# Patient Record
Sex: MALE | Race: WHITE
[De-identification: names, ages, dates, MRNs, and addresses within clinical notes are randomized per-mention and may not be internally consistent; named-entity substitution may affect disease eponyms.]

---

## 2023-01-26 PROBLEM — Z00.00 ENCOUNTER FOR PREVENTIVE HEALTH EXAMINATION: Status: ACTIVE | Noted: 2023-01-26

## 2023-01-27 ENCOUNTER — APPOINTMENT (OUTPATIENT)
Dept: ORTHOPEDIC SURGERY | Facility: CLINIC | Age: 23
End: 2023-01-27
Payer: COMMERCIAL

## 2023-01-27 VITALS
WEIGHT: 170 LBS | BODY MASS INDEX: 25.18 KG/M2 | SYSTOLIC BLOOD PRESSURE: 116 MMHG | HEIGHT: 69 IN | TEMPERATURE: 97.5 F | HEART RATE: 59 BPM | DIASTOLIC BLOOD PRESSURE: 71 MMHG | OXYGEN SATURATION: 99 %

## 2023-01-27 DIAGNOSIS — M23.321 OTHER MENISCUS DERANGEMENTS, POSTERIOR HORN OF MEDIAL MENISCUS, RIGHT KNEE: ICD-10-CM

## 2023-01-27 DIAGNOSIS — F17.200 NICOTINE DEPENDENCE, UNSPECIFIED, UNCOMPLICATED: ICD-10-CM

## 2023-01-27 DIAGNOSIS — Z72.89 OTHER PROBLEMS RELATED TO LIFESTYLE: ICD-10-CM

## 2023-01-27 DIAGNOSIS — Z86.39 PERSONAL HISTORY OF OTHER ENDOCRINE, NUTRITIONAL AND METABOLIC DISEASE: ICD-10-CM

## 2023-01-27 DIAGNOSIS — Z83.438 FAMILY HISTORY OF OTHER DISORDER OF LIPOPROTEIN METABOLISM AND OTHER LIPIDEMIA: ICD-10-CM

## 2023-01-27 DIAGNOSIS — Z80.42 FAMILY HISTORY OF MALIGNANT NEOPLASM OF PROSTATE: ICD-10-CM

## 2023-01-27 DIAGNOSIS — Z83.3 FAMILY HISTORY OF DIABETES MELLITUS: ICD-10-CM

## 2023-01-27 PROCEDURE — 99203 OFFICE O/P NEW LOW 30 MIN: CPT

## 2023-01-27 PROCEDURE — 73564 X-RAY EXAM KNEE 4 OR MORE: CPT | Mod: RT

## 2023-01-27 NOTE — PHYSICAL EXAM
[LE] : Sensory: Intact in bilateral lower extremities [Knee] : patellar 2+ and symmetric bilaterally [PT] : posterior tibial 2+ and symmetric bilaterally [de-identified] : Pt is a pleasant 22 year old male in NAD and AAOx3. 25 BMI. The pt has a mildly antalgic gait. Physical examination of both knees reveals normal contours, skin intact with no signs of infection, no erythema, no swelling, no effusion, no distal lymphedema or phlebitis, no patholaxity. ROM of the right knee reveals 0°-125° Strength is 5/5 within this arc of motion. There is no pain on palpation to the medial/lateral joint line. Patient noted to have pain with full extension and with duck walk (keanu test). There are no neurological deficits.\par  [de-identified] : X-rays were ordered, obtained, and interpreted by me today: 4 views of the right knee demonstrate no acute bony pathology , with no acute fracture or dislocation.\par \par Mri right knee from [facility] 1/4/23 reviewed: demonstrating complex tear of the posterior horn and root of the medial meniscus as well as low grade MCL and PLC injury.\par

## 2023-01-27 NOTE — HISTORY OF PRESENT ILLNESS
[de-identified] : 21 y/o male who works at the [place] [employer] presents with right knee pain due to an injury while horse playing with his friend on 12/23/2023. He states that he he was pushed back with his knee bent underneath him. He was seen in Dr. Jens Mendes  and had an MRI done. He was treated with PT which did help. He is here for a second opinion. He states that his symptoms had improved but he has not return to all activities. He does have some mild symptoms with certain motions. He denies any numbness or tingling. \par \par Hx: HLD

## 2023-01-27 NOTE — DISCUSSION/SUMMARY
[de-identified] : Pt is a pleasant 22 year old male with right knee pain secondary to medial meniscus root tear.  A lengthy discussion was held regarding the patients condition and treatment options including all risks, benefits, prognosis and outcomes of each were discussed in detail. Surgical v nonoperative management was discussed and operative management with arthroscopic meniscal root repair was advised since he is a young active pt that has physically demanding work. Post op care discussed which includes non weightbearing on crutches for 6 weeks and up to 2 months off of work. The patient is going to consider surgical timing since he just received a recent job offer. Information for surgical coordinator provided. He will contact surgical coordinator if he decides on pursuing surgery in the near future. The patient will contact me if there are any concerns.  Follow up will be prn. The patient expressed understanding and all questions were answered.\par

## 2023-04-06 ENCOUNTER — APPOINTMENT (OUTPATIENT)
Dept: ORTHOPEDIC SURGERY | Facility: HOSPITAL | Age: 23
End: 2023-04-06

## 2023-12-10 ENCOUNTER — EMERGENCY (EMERGENCY)
Facility: HOSPITAL | Age: 23
LOS: 1 days | Discharge: HOME | End: 2023-12-10
Attending: EMERGENCY MEDICINE | Admitting: EMERGENCY MEDICINE
Payer: COMMERCIAL

## 2023-12-10 VITALS
DIASTOLIC BLOOD PRESSURE: 89 MMHG | WEIGHT: 179.9 LBS | OXYGEN SATURATION: 99 % | HEIGHT: 70 IN | RESPIRATION RATE: 19 BRPM | HEART RATE: 95 BPM | TEMPERATURE: 99 F | SYSTOLIC BLOOD PRESSURE: 135 MMHG

## 2023-12-10 PROCEDURE — 99284 EMERGENCY DEPT VISIT MOD MDM: CPT

## 2023-12-11 VITALS
DIASTOLIC BLOOD PRESSURE: 64 MMHG | OXYGEN SATURATION: 97 % | HEART RATE: 84 BPM | RESPIRATION RATE: 17 BRPM | TEMPERATURE: 98 F | SYSTOLIC BLOOD PRESSURE: 101 MMHG

## 2023-12-11 LAB
ALBUMIN SERPL ELPH-MCNC: 4 G/DL — SIGNIFICANT CHANGE UP (ref 3.3–5)
ALBUMIN SERPL ELPH-MCNC: 4 G/DL — SIGNIFICANT CHANGE UP (ref 3.3–5)
ALP SERPL-CCNC: 113 U/L — SIGNIFICANT CHANGE UP (ref 40–120)
ALP SERPL-CCNC: 113 U/L — SIGNIFICANT CHANGE UP (ref 40–120)
ALT FLD-CCNC: 45 U/L — SIGNIFICANT CHANGE UP (ref 12–78)
ALT FLD-CCNC: 45 U/L — SIGNIFICANT CHANGE UP (ref 12–78)
ANION GAP SERPL CALC-SCNC: 9 MMOL/L — SIGNIFICANT CHANGE UP (ref 5–17)
ANION GAP SERPL CALC-SCNC: 9 MMOL/L — SIGNIFICANT CHANGE UP (ref 5–17)
AST SERPL-CCNC: 23 U/L — SIGNIFICANT CHANGE UP (ref 15–37)
AST SERPL-CCNC: 23 U/L — SIGNIFICANT CHANGE UP (ref 15–37)
BASOPHILS # BLD AUTO: 0.03 K/UL — SIGNIFICANT CHANGE UP (ref 0–0.2)
BASOPHILS # BLD AUTO: 0.03 K/UL — SIGNIFICANT CHANGE UP (ref 0–0.2)
BASOPHILS NFR BLD AUTO: 0.3 % — SIGNIFICANT CHANGE UP (ref 0–2)
BASOPHILS NFR BLD AUTO: 0.3 % — SIGNIFICANT CHANGE UP (ref 0–2)
BILIRUB SERPL-MCNC: 0.8 MG/DL — SIGNIFICANT CHANGE UP (ref 0.2–1.2)
BILIRUB SERPL-MCNC: 0.8 MG/DL — SIGNIFICANT CHANGE UP (ref 0.2–1.2)
BUN SERPL-MCNC: 16 MG/DL — SIGNIFICANT CHANGE UP (ref 7–23)
BUN SERPL-MCNC: 16 MG/DL — SIGNIFICANT CHANGE UP (ref 7–23)
CALCIUM SERPL-MCNC: 9.2 MG/DL — SIGNIFICANT CHANGE UP (ref 8.5–10.1)
CALCIUM SERPL-MCNC: 9.2 MG/DL — SIGNIFICANT CHANGE UP (ref 8.5–10.1)
CHLORIDE SERPL-SCNC: 105 MMOL/L — SIGNIFICANT CHANGE UP (ref 96–108)
CHLORIDE SERPL-SCNC: 105 MMOL/L — SIGNIFICANT CHANGE UP (ref 96–108)
CO2 SERPL-SCNC: 26 MMOL/L — SIGNIFICANT CHANGE UP (ref 22–31)
CO2 SERPL-SCNC: 26 MMOL/L — SIGNIFICANT CHANGE UP (ref 22–31)
CREAT SERPL-MCNC: 1.2 MG/DL — SIGNIFICANT CHANGE UP (ref 0.5–1.3)
CREAT SERPL-MCNC: 1.2 MG/DL — SIGNIFICANT CHANGE UP (ref 0.5–1.3)
EGFR: 88 ML/MIN/1.73M2 — SIGNIFICANT CHANGE UP
EGFR: 88 ML/MIN/1.73M2 — SIGNIFICANT CHANGE UP
EOSINOPHIL # BLD AUTO: 0.11 K/UL — SIGNIFICANT CHANGE UP (ref 0–0.5)
EOSINOPHIL # BLD AUTO: 0.11 K/UL — SIGNIFICANT CHANGE UP (ref 0–0.5)
EOSINOPHIL NFR BLD AUTO: 1.2 % — SIGNIFICANT CHANGE UP (ref 0–6)
EOSINOPHIL NFR BLD AUTO: 1.2 % — SIGNIFICANT CHANGE UP (ref 0–6)
FLUAV AG NPH QL: SIGNIFICANT CHANGE UP
FLUAV AG NPH QL: SIGNIFICANT CHANGE UP
FLUBV AG NPH QL: SIGNIFICANT CHANGE UP
FLUBV AG NPH QL: SIGNIFICANT CHANGE UP
GLUCOSE SERPL-MCNC: 110 MG/DL — HIGH (ref 70–99)
GLUCOSE SERPL-MCNC: 110 MG/DL — HIGH (ref 70–99)
HCT VFR BLD CALC: 45.4 % — SIGNIFICANT CHANGE UP (ref 39–50)
HCT VFR BLD CALC: 45.4 % — SIGNIFICANT CHANGE UP (ref 39–50)
HGB BLD-MCNC: 15.9 G/DL — SIGNIFICANT CHANGE UP (ref 13–17)
HGB BLD-MCNC: 15.9 G/DL — SIGNIFICANT CHANGE UP (ref 13–17)
IMM GRANULOCYTES NFR BLD AUTO: 0.2 % — SIGNIFICANT CHANGE UP (ref 0–0.9)
IMM GRANULOCYTES NFR BLD AUTO: 0.2 % — SIGNIFICANT CHANGE UP (ref 0–0.9)
LYMPHOCYTES # BLD AUTO: 1.56 K/UL — SIGNIFICANT CHANGE UP (ref 1–3.3)
LYMPHOCYTES # BLD AUTO: 1.56 K/UL — SIGNIFICANT CHANGE UP (ref 1–3.3)
LYMPHOCYTES # BLD AUTO: 16.7 % — SIGNIFICANT CHANGE UP (ref 13–44)
LYMPHOCYTES # BLD AUTO: 16.7 % — SIGNIFICANT CHANGE UP (ref 13–44)
MCHC RBC-ENTMCNC: 29.8 PG — SIGNIFICANT CHANGE UP (ref 27–34)
MCHC RBC-ENTMCNC: 29.8 PG — SIGNIFICANT CHANGE UP (ref 27–34)
MCHC RBC-ENTMCNC: 35 GM/DL — SIGNIFICANT CHANGE UP (ref 32–36)
MCHC RBC-ENTMCNC: 35 GM/DL — SIGNIFICANT CHANGE UP (ref 32–36)
MCV RBC AUTO: 85 FL — SIGNIFICANT CHANGE UP (ref 80–100)
MCV RBC AUTO: 85 FL — SIGNIFICANT CHANGE UP (ref 80–100)
MONOCYTES # BLD AUTO: 1.01 K/UL — HIGH (ref 0–0.9)
MONOCYTES # BLD AUTO: 1.01 K/UL — HIGH (ref 0–0.9)
MONOCYTES NFR BLD AUTO: 10.8 % — SIGNIFICANT CHANGE UP (ref 2–14)
MONOCYTES NFR BLD AUTO: 10.8 % — SIGNIFICANT CHANGE UP (ref 2–14)
NEUTROPHILS # BLD AUTO: 6.59 K/UL — SIGNIFICANT CHANGE UP (ref 1.8–7.4)
NEUTROPHILS # BLD AUTO: 6.59 K/UL — SIGNIFICANT CHANGE UP (ref 1.8–7.4)
NEUTROPHILS NFR BLD AUTO: 70.8 % — SIGNIFICANT CHANGE UP (ref 43–77)
NEUTROPHILS NFR BLD AUTO: 70.8 % — SIGNIFICANT CHANGE UP (ref 43–77)
NRBC # BLD: 0 /100 WBCS — SIGNIFICANT CHANGE UP (ref 0–0)
NRBC # BLD: 0 /100 WBCS — SIGNIFICANT CHANGE UP (ref 0–0)
PLATELET # BLD AUTO: 183 K/UL — SIGNIFICANT CHANGE UP (ref 150–400)
PLATELET # BLD AUTO: 183 K/UL — SIGNIFICANT CHANGE UP (ref 150–400)
POTASSIUM SERPL-MCNC: 3.7 MMOL/L — SIGNIFICANT CHANGE UP (ref 3.5–5.3)
POTASSIUM SERPL-MCNC: 3.7 MMOL/L — SIGNIFICANT CHANGE UP (ref 3.5–5.3)
POTASSIUM SERPL-SCNC: 3.7 MMOL/L — SIGNIFICANT CHANGE UP (ref 3.5–5.3)
POTASSIUM SERPL-SCNC: 3.7 MMOL/L — SIGNIFICANT CHANGE UP (ref 3.5–5.3)
PROT SERPL-MCNC: 8.1 G/DL — SIGNIFICANT CHANGE UP (ref 6–8.3)
PROT SERPL-MCNC: 8.1 G/DL — SIGNIFICANT CHANGE UP (ref 6–8.3)
RBC # BLD: 5.34 M/UL — SIGNIFICANT CHANGE UP (ref 4.2–5.8)
RBC # BLD: 5.34 M/UL — SIGNIFICANT CHANGE UP (ref 4.2–5.8)
RBC # FLD: 12.1 % — SIGNIFICANT CHANGE UP (ref 10.3–14.5)
RBC # FLD: 12.1 % — SIGNIFICANT CHANGE UP (ref 10.3–14.5)
RSV RNA NPH QL NAA+NON-PROBE: SIGNIFICANT CHANGE UP
RSV RNA NPH QL NAA+NON-PROBE: SIGNIFICANT CHANGE UP
SARS-COV-2 RNA SPEC QL NAA+PROBE: SIGNIFICANT CHANGE UP
SARS-COV-2 RNA SPEC QL NAA+PROBE: SIGNIFICANT CHANGE UP
SODIUM SERPL-SCNC: 140 MMOL/L — SIGNIFICANT CHANGE UP (ref 135–145)
SODIUM SERPL-SCNC: 140 MMOL/L — SIGNIFICANT CHANGE UP (ref 135–145)
WBC # BLD: 9.32 K/UL — SIGNIFICANT CHANGE UP (ref 3.8–10.5)
WBC # BLD: 9.32 K/UL — SIGNIFICANT CHANGE UP (ref 3.8–10.5)
WBC # FLD AUTO: 9.32 K/UL — SIGNIFICANT CHANGE UP (ref 3.8–10.5)
WBC # FLD AUTO: 9.32 K/UL — SIGNIFICANT CHANGE UP (ref 3.8–10.5)

## 2023-12-11 PROCEDURE — 36415 COLL VENOUS BLD VENIPUNCTURE: CPT

## 2023-12-11 PROCEDURE — 80053 COMPREHEN METABOLIC PANEL: CPT

## 2023-12-11 PROCEDURE — 87637 SARSCOV2&INF A&B&RSV AMP PRB: CPT

## 2023-12-11 PROCEDURE — 85025 COMPLETE CBC W/AUTO DIFF WBC: CPT

## 2023-12-11 PROCEDURE — 99284 EMERGENCY DEPT VISIT MOD MDM: CPT | Mod: 25

## 2023-12-11 PROCEDURE — 96374 THER/PROPH/DIAG INJ IV PUSH: CPT

## 2023-12-11 RX ORDER — KETOROLAC TROMETHAMINE 30 MG/ML
30 SYRINGE (ML) INJECTION ONCE
Refills: 0 | Status: DISCONTINUED | OUTPATIENT
Start: 2023-12-11 | End: 2023-12-11

## 2023-12-11 RX ORDER — SODIUM CHLORIDE 9 MG/ML
1000 INJECTION INTRAMUSCULAR; INTRAVENOUS; SUBCUTANEOUS ONCE
Refills: 0 | Status: COMPLETED | OUTPATIENT
Start: 2023-12-11 | End: 2023-12-11

## 2023-12-11 RX ADMIN — Medication 30 MILLIGRAM(S): at 05:04

## 2023-12-11 RX ADMIN — SODIUM CHLORIDE 1000 MILLILITER(S): 9 INJECTION INTRAMUSCULAR; INTRAVENOUS; SUBCUTANEOUS at 05:04

## 2023-12-11 NOTE — ED PROVIDER NOTE - OBJECTIVE STATEMENT
23-year-old male no significant past medical history complaining of 3 days of headache neck stiffness.  States the headache is worse when lying on his side and better when he sits up.  Denies any photophobia.  Denies any fever or chills.  Denies any body aches.  Has been taking Tylenol for the headache.  Denies any nausea vomiting.

## 2023-12-11 NOTE — ED PROVIDER NOTE - PHYSICAL EXAMINATION
Gen: Alert, NAD  Head/eyes: NC/AT, PERRL  ENT: airway patent  Neck: supple, FROM in neck  Pulm/lung: Bilateral clear BS  CV/heart: RRR  GI/Abd: soft, NT/ND, +BS, no guarding/rebound tenderness  Musculoskeletal: no edema/erythema/cyanosis  Skin: no rash  Neuro: AAOx3, grossly intact

## 2023-12-11 NOTE — ED ADULT NURSE NOTE - OBJECTIVE STATEMENT
Pt stated he has a headache on right side of head "stabbing" 8/10 with pain along neck side of the right, Right ears "sensitive" with light sensitivity that started on Friday Went to urgent care, and was administered Oral antibiotics. Denies fever  chills, nausea or vomiting

## 2023-12-11 NOTE — ED ADULT NURSE NOTE - NSFALLUNIVINTERV_ED_ALL_ED
Bed/Stretcher in lowest position, wheels locked, appropriate side rails in place/Call bell, personal items and telephone in reach/Instruct patient to call for assistance before getting out of bed/chair/stretcher/Non-slip footwear applied when patient is off stretcher/[place] to call system/Physically safe environment - no spills, clutter or unnecessary equipment/Purposeful proactive rounding/Room/bathroom lighting operational, light cord in reach Bed/Stretcher in lowest position, wheels locked, appropriate side rails in place/Call bell, personal items and telephone in reach/Instruct patient to call for assistance before getting out of bed/chair/stretcher/Non-slip footwear applied when patient is off stretcher/[place] to call system/Physically safe environment - no spills, clutter or unnecessary equipment/Purposeful proactive rounding/Room/bathroom lighting operational, light cord in reach

## 2023-12-11 NOTE — ED ADULT NURSE REASSESSMENT NOTE - NSFALLUNIVINTERV_ED_ALL_ED
Bed/Stretcher in lowest position, wheels locked, appropriate side rails in place/Call bell, personal items and telephone in reach/Instruct patient to call for assistance before getting out of bed/chair/stretcher/Non-slip footwear applied when patient is off stretcher/[place] to call system/Physically safe environment - no spills, clutter or unnecessary equipment/Purposeful proactive rounding/Room/bathroom lighting operational, light cord in reach Bed/Stretcher in lowest position, wheels locked, appropriate side rails in place/Call bell, personal items and telephone in reach/Instruct patient to call for assistance before getting out of bed/chair/stretcher/Non-slip footwear applied when patient is off stretcher/[place] to call system/Physically safe environment - no spills, clutter or unnecessary equipment/Purposeful proactive rounding/Room/bathroom lighting operational, light cord in reach

## 2023-12-11 NOTE — ED PROVIDER NOTE - NSFOLLOWUPINSTRUCTIONS_ED_ALL_ED_FT
1) Follow-up with your Primary Medical Doctor or referred doctor. Call today / next business day for prompt follow-up.  2) Return to Emergency room for any worsening or persistent pain, weakness, fever, or any other concerning symptoms.  3) See attached instruction sheets for additional information, including information regarding signs and symptoms to look out for, reasons to seek immediate care and other important instructions.  4) Follow-up with any specialists as discussed / noted as well.    WHAT YOU NEED TO KNOW:    An acute headache is pain or discomfort that may start suddenly and get worse quickly. You may have an acute headache only when you feel stress or eat certain foods. Other acute headache pain can happen every day, and sometimes several times a day.  Headache Types    DISCHARGE INSTRUCTIONS:    Seek care immediately if:    You have severe pain.    You have numbness or weakness on one side of your face or body.    You have a headache that occurs after a blow to the head, a fall, or other trauma.    You have a headache, are forgetful or confused, or have trouble speaking.    You have a headache, stiff neck, and a fever.  Call your doctor if:    You have a constant headache and are vomiting.    You have a headache each day that does not get better, even after treatment.    You have changes in your headaches, or new symptoms that occur when you have a headache.    You have questions or concerns about your condition or care.  Medicines: You may need any of the following:    Prescription pain medicine may be given. The medicine your healthcare provider recommends will depend on the kind of headaches you have. You will need to take prescription headache medicines as directed to prevent a problem called rebound headache. These headaches happen with regular use of pain relievers for headache disorders.    NSAIDs, such as ibuprofen, help decrease swelling, pain, and fever. This medicine is available with or without a doctor's order. NSAIDs can cause stomach bleeding or kidney problems in certain people. If you take blood thinner medicine, always ask your healthcare provider if NSAIDs are safe for you. Always read the medicine label and follow directions.    Acetaminophen decreases pain and fever. It is available without a doctor's order. Ask how much to take and how often to take it. Follow directions. Read the labels of all other medicines you are using to see if they also contain acetaminophen, or ask your doctor or pharmacist. Acetaminophen can cause liver damage if not taken correctly.    Antidepressants may be given for some kinds of headaches.    Take your medicine as directed. Contact your healthcare provider if you think your medicine is not helping or if you have side effects. Tell your provider if you are allergic to any medicine. Keep a list of the medicines, vitamins, and herbs you take. Include the amounts, and when and why you take them. Bring the list or the pill bottles to follow-up visits. Carry your medicine list with you in case of an emergency.  Manage your symptoms:    Apply heat or ice on the headache area. Use a heat or ice pack. For an ice pack, you can also put crushed ice in a plastic bag. Cover the pack or bag with a towel before you apply it to your skin. Ice and heat both help decrease pain, and heat also helps decrease muscle spasms. Apply heat for 20 to 30 minutes every 2 hours. Apply ice for 15 to 20 minutes every hour. Apply heat or ice for as long and for as many days as directed. You may alternate heat and ice.    Relax your muscles. Lie down in a comfortable position and close your eyes. Relax your muscles slowly. Start at your toes and work your way up your body.    Keep a record of your headaches. Write down when your headaches start and stop. Include your symptoms and what you were doing when the headache began. Record what you ate or drank for 24 hours before the headache started. Describe the pain and where it hurts. Keep track of what you did to treat your headache and if it worked.  Prevent an acute headache:    Avoid anything that triggers an acute headache. Examples include exposure to chemicals, going to high altitude, or not getting enough sleep. Create a regular sleep routine. Go to sleep at the same time and wake up at the same time each day. Do not use electronic devices before bedtime. These may trigger a headache or prevent you from sleeping well.    Do not smoke. Nicotine and other chemicals in cigarettes and cigars can trigger an acute headache or make it worse. Ask your healthcare provider for information if you currently smoke and need help to quit. E-cigarettes or smokeless tobacco still contain nicotine. Talk to your healthcare provider before you use these products.    Limit alcohol as directed. Alcohol can trigger an acute headache or make it worse. If you have cluster headaches, do not drink alcohol during an episode. For other types of headaches, ask your healthcare provider if it is safe for you to drink alcohol. Ask how much is safe for you to drink, and how often.    Exercise as directed. Exercise can reduce tension and help with headache pain. Aim for 30 minutes of physical activity on most days of the week. Your healthcare provider can help you create an exercise plan.    Eat a variety of healthy foods. Healthy foods include fruits, vegetables, low-fat dairy products, lean meats, fish, whole grains, and cooked beans. Your healthcare provider or dietitian can help you create meals plans if you need to avoid foods that trigger headaches.  Follow up with your healthcare provider as directed: Bring your headache record with you when you see your healthcare provider. Write down your questions so you remember to ask them during your visits.

## 2023-12-11 NOTE — ED PROVIDER NOTE - CLINICAL SUMMARY MEDICAL DECISION MAKING FREE TEXT BOX
23-year-old male no significant past medical history complaining of 3 days of headache neck stiffness.  States the headache is worse when lying on his side and better when he sits up.  Denies any photophobia.  Denies any fever or chills.  Denies any body aches.  Has been taking Tylenol for the headache.  Denies any nausea vomiting.    r/o viral etiology, meningitis, swab, labs, IV analgesia, reassess

## 2023-12-11 NOTE — ED PROVIDER NOTE - PATIENT PORTAL LINK FT
You can access the FollowMyHealth Patient Portal offered by [facility] by registering at the following website: [email or web address]. By joining [employer]’s FollowMyHealth portal, you will also be able to view your health information using other applications (apps) compatible with our system. You can access the FollowMyHealth Patient Portal offered by [facility] by registering at the following website: [email or web address]. By joining [employer]’s FollowMyHealth portal, you will also be able to view your health information using other applications (apps) compatible with our system.

## 2023-12-11 NOTE — ED PROVIDER NOTE - PROGRESS NOTE DETAILS
Patient reassessed labs explained patient feels much better.  Explained possibility of meningitis likely viral offered LP but patient and mother declined.  Understands to return if any worsening symptoms. Patient is on amoxicillin currently from the urgent care took 1 dose so far.

## 2023-12-13 ENCOUNTER — NON-APPOINTMENT (OUTPATIENT)
Age: 23
End: 2023-12-13

## 2023-12-13 ENCOUNTER — APPOINTMENT (OUTPATIENT)
Dept: OTOLARYNGOLOGY | Facility: CLINIC | Age: 23
End: 2023-12-13
Payer: COMMERCIAL

## 2023-12-13 VITALS
BODY MASS INDEX: 26.66 KG/M2 | SYSTOLIC BLOOD PRESSURE: 111 MMHG | HEART RATE: 75 BPM | DIASTOLIC BLOOD PRESSURE: 71 MMHG | HEIGHT: 69 IN | WEIGHT: 180 LBS

## 2023-12-13 DIAGNOSIS — R22.1 LOCALIZED SWELLING, MASS AND LUMP, NECK: ICD-10-CM

## 2023-12-13 DIAGNOSIS — R51.9 HEADACHE, UNSPECIFIED: ICD-10-CM

## 2023-12-13 DIAGNOSIS — M54.2 CERVICALGIA: ICD-10-CM

## 2023-12-13 DIAGNOSIS — J34.2 DEVIATED NASAL SEPTUM: ICD-10-CM

## 2023-12-13 DIAGNOSIS — J35.1 HYPERTROPHY OF TONSILS: ICD-10-CM

## 2023-12-13 DIAGNOSIS — J31.0 CHRONIC RHINITIS: ICD-10-CM

## 2023-12-13 PROCEDURE — 99244 OFF/OP CNSLTJ NEW/EST MOD 40: CPT

## 2023-12-13 NOTE — ASSESSMENT
[FreeTextEntry1] :   Reviewed and reconciled medications, allergies, PMHx, PSHx, SocHx, FMHx  Presents today as a Pt of Dr. Man. Pt c/o headache and stiff neck on the R side that started on friday and has worsened. he tried amoxicillin from urgent care to no avail. He has a lump in his ear and got a CT scan from [place] on his neck, there was a cyst. His ears hurt and c/o of stabbing pain. Pt was prescribed clindamycin from Dr. Man.  Physical exam Nasal: inflammed turbinates, no discharge sinus nontender Oral tonsils class 4, R one has extrudate neck L side not painful, small cyst over hyaline cartilage on L side  Ct scan of neck done dec 7th several 1b lymph nodes 1.3 x 1x1 soft tissue mass within the strap muscles in anterior neck on L side  Plan:  Discussed seeing radiology at [facility]

## 2023-12-13 NOTE — PHYSICAL EXAM
[Midline] : trachea located in midline position [de-identified] :  Cyst on hyaline cartilage L side [de-identified] : inflammed turbinates [Normal] : lingual tonsils are normal [de-identified] : class 4. R tonsil has exudate

## 2023-12-13 NOTE — REASON FOR VISIT
[Subsequent Evaluation] : a subsequent evaluation for [FreeTextEntry2] : swelling both sides of neck

## 2023-12-13 NOTE — HISTORY OF PRESENT ILLNESS
[de-identified] : Presents today as a Pt of Dr. Man. Pt c/o headache and stiff neck on the R side that started on friday and has worsened. he tried amoxicillin from urgent care to no avail. He has a lump in his ear and got a CT scan from [place] on his neck, there was a cyst. His ears hurt and c/o of stabbing pain. Pt was prescribed clindamycin from Dr. Man.

## 2023-12-15 ENCOUNTER — RESULT REVIEW (OUTPATIENT)
Age: 23
End: 2023-12-15

## 2023-12-19 ENCOUNTER — APPOINTMENT (OUTPATIENT)
Dept: MRI IMAGING | Facility: CLINIC | Age: 23
End: 2023-12-19
Payer: COMMERCIAL

## 2023-12-19 PROCEDURE — 70543 MRI ORBT/FAC/NCK W/O &W/DYE: CPT

## 2023-12-19 PROCEDURE — 70548 MR ANGIOGRAPHY NECK W/DYE: CPT

## 2023-12-19 PROCEDURE — 70548 MR ANGIOGRAPHY NECK W/DYE: CPT | Mod: 76

## 2023-12-19 PROCEDURE — A9585: CPT | Mod: JW

## 2023-12-19 PROCEDURE — 70544 MR ANGIOGRAPHY HEAD W/O DYE: CPT

## 2023-12-19 PROCEDURE — A9585: CPT

## 2023-12-23 ENCOUNTER — APPOINTMENT (OUTPATIENT)
Dept: MRI IMAGING | Facility: CLINIC | Age: 23
End: 2023-12-23

## 2024-03-20 ENCOUNTER — APPOINTMENT (OUTPATIENT)
Dept: ULTRASOUND IMAGING | Facility: CLINIC | Age: 24
End: 2024-03-20
Payer: COMMERCIAL

## 2024-03-20 PROCEDURE — 76857 US EXAM PELVIC LIMITED: CPT

## 2024-03-25 ENCOUNTER — APPOINTMENT (OUTPATIENT)
Dept: SURGERY | Facility: CLINIC | Age: 24
End: 2024-03-25
Payer: COMMERCIAL

## 2024-03-25 VITALS
DIASTOLIC BLOOD PRESSURE: 69 MMHG | TEMPERATURE: 98.7 F | HEIGHT: 69 IN | HEART RATE: 72 BPM | OXYGEN SATURATION: 97 % | BODY MASS INDEX: 26.66 KG/M2 | SYSTOLIC BLOOD PRESSURE: 111 MMHG | WEIGHT: 180 LBS

## 2024-03-25 DIAGNOSIS — R10.31 RIGHT LOWER QUADRANT PAIN: ICD-10-CM

## 2024-03-25 DIAGNOSIS — S33.8XXA SPRAIN OF OTHER PARTS OF LUMBAR SPINE AND PELVIS, INITIAL ENCOUNTER: ICD-10-CM

## 2024-03-25 PROCEDURE — 99203 OFFICE O/P NEW LOW 30 MIN: CPT

## 2024-03-30 ENCOUNTER — APPOINTMENT (OUTPATIENT)
Dept: CT IMAGING | Facility: CLINIC | Age: 24
End: 2024-03-30

## 2024-04-03 ENCOUNTER — APPOINTMENT (OUTPATIENT)
Dept: ORTHOPEDIC SURGERY | Facility: CLINIC | Age: 24
End: 2024-04-03
Payer: COMMERCIAL

## 2024-04-03 VITALS — WEIGHT: 180 LBS | BODY MASS INDEX: 26.66 KG/M2 | HEIGHT: 69 IN

## 2024-04-03 DIAGNOSIS — M25.851 OTHER SPECIFIED JOINT DISORDERS, RIGHT HIP: ICD-10-CM

## 2024-04-03 PROCEDURE — 73502 X-RAY EXAM HIP UNI 2-3 VIEWS: CPT

## 2024-04-03 PROCEDURE — 99203 OFFICE O/P NEW LOW 30 MIN: CPT

## 2024-04-03 NOTE — HISTORY OF PRESENT ILLNESS
[Sudden] : sudden [5] : 5 [0] : 0 [Radiating] : radiating [Occasional] : occasional [Nothing helps with pain getting better] : Nothing helps with pain getting better [de-identified] : This is Mr. PIEDAD WELDON  a 23 year old male who comes in today complaining of right hip pain for a while.  He thinks it may be from doing a split on a skateboard ramp.   Has pain when trying to rotate hip internally.  [] : no [FreeTextEntry7] : into groin [de-identified] : certain movements

## 2024-04-03 NOTE — DISCUSSION/SUMMARY
[de-identified] : Discussed options, Start Physical Therapy f/u 6-8 weeks ----------------------------------------------------------------------------   All relevant imaging studies pertinent to today's visit, including x-rays, MRI's and/or other advanced imaging studies (CT/etc) were independently interpreted and reviewed with the patient as needed. Implications of the studies together with the patient's clinical picture were discussed to formulate a working diagnosis and management options were detailed.   The patient and/or guardian was advised of the diagnosis.  The natural history of the pathology was explained in full. All questions were answered.  The risks and benefits of conservative and interventional treatment alternatives were explained to the patient  The patient and/or guardian was advised if any advanced diagnostic/imaging study (MRI/CT/etc) is ordered to evaluate potential pathology in the affected area(s), they should follow up in the office to review the results of the study and determine further management that may be indicated.

## 2024-04-03 NOTE — IMAGING
[de-identified] :  ----------------------------------------------------------------------------   Right hip exam:   Inspection: no deformity, no swelling, no gross limb length discrepancy ROM:    Flexion: 100    ER: 30    IR: 10 Tenderness:    (neg) Groin tenderness    (neg) Greater trochanteric tenderness    (neg) Buttock tenderness    (neg) IT Band tenderness    (neg) Anterior thigh tenderness    (neg)ASIS tenderness    (neg) Ischial tuberosity    (neg) Hamstring muscle tenderness Additional tests:    (+mild) FADIR    (neg) DIANA    (neg) Resisted hip flexion pain    (neg) Apprehension (external rotation/extension)    (neg) Posterior pain with forced hip flexion and knee extension    (neg) Tight hamstrings    (neg) Log roll    (neg) Axial load Strength: 5/5 IP/Q/H/TA/GS/EHL Neuro: In tact to light touch throughout, DTR's wnl Vascularity: Extremity warm and well perfused Gait: normal.   [Right] : right hip with pelvis [All Views] : anteroposterior, lateral [There are no fractures, subluxations or dislocations. No significant abnormalities are seen] : There are no fractures, subluxations or dislocations. No significant abnormalities are seen [FreeTextEntry9] : CAM deformity

## 2025-04-15 ENCOUNTER — NON-APPOINTMENT (OUTPATIENT)
Age: 25
End: 2025-04-15

## 2025-06-07 ENCOUNTER — INPATIENT (INPATIENT)
Facility: HOSPITAL | Age: 25
LOS: 4 days | Discharge: HOME | DRG: 387 | End: 2025-06-12
Attending: GENERAL PRACTICE | Admitting: STUDENT IN AN ORGANIZED HEALTH CARE EDUCATION/TRAINING PROGRAM
Payer: COMMERCIAL

## 2025-06-07 ENCOUNTER — NON-APPOINTMENT (OUTPATIENT)
Age: 25
End: 2025-06-07

## 2025-06-07 VITALS
RESPIRATION RATE: 20 BRPM | HEART RATE: 102 BPM | WEIGHT: 175.05 LBS | HEIGHT: 69 IN | OXYGEN SATURATION: 97 % | TEMPERATURE: 102 F | DIASTOLIC BLOOD PRESSURE: 69 MMHG | SYSTOLIC BLOOD PRESSURE: 117 MMHG

## 2025-06-07 DIAGNOSIS — K50.00 CROHN'S DISEASE OF SMALL INTESTINE WITHOUT COMPLICATIONS: ICD-10-CM

## 2025-06-07 LAB
ALBUMIN SERPL ELPH-MCNC: 3.9 G/DL — SIGNIFICANT CHANGE UP (ref 3.3–5)
ALP SERPL-CCNC: 97 U/L — SIGNIFICANT CHANGE UP (ref 40–120)
ALT FLD-CCNC: 36 U/L — SIGNIFICANT CHANGE UP (ref 12–78)
ANION GAP SERPL CALC-SCNC: 11 MMOL/L — SIGNIFICANT CHANGE UP (ref 5–17)
APPEARANCE UR: CLEAR — SIGNIFICANT CHANGE UP
APTT BLD: 30.1 SEC — SIGNIFICANT CHANGE UP (ref 26.1–36.8)
AST SERPL-CCNC: 21 U/L — SIGNIFICANT CHANGE UP (ref 15–37)
BASOPHILS # BLD AUTO: 0.03 K/UL — SIGNIFICANT CHANGE UP (ref 0–0.2)
BASOPHILS NFR BLD AUTO: 0.3 % — SIGNIFICANT CHANGE UP (ref 0–2)
BILIRUB SERPL-MCNC: 0.9 MG/DL — SIGNIFICANT CHANGE UP (ref 0.2–1.2)
BILIRUB UR-MCNC: NEGATIVE — SIGNIFICANT CHANGE UP
BUN SERPL-MCNC: 15 MG/DL — SIGNIFICANT CHANGE UP (ref 7–23)
CALCIUM SERPL-MCNC: 9.3 MG/DL — SIGNIFICANT CHANGE UP (ref 8.5–10.1)
CHLORIDE SERPL-SCNC: 98 MMOL/L — SIGNIFICANT CHANGE UP (ref 96–108)
CO2 SERPL-SCNC: 23 MMOL/L — SIGNIFICANT CHANGE UP (ref 22–31)
COLOR SPEC: SIGNIFICANT CHANGE UP
CREAT SERPL-MCNC: 1.4 MG/DL — HIGH (ref 0.5–1.3)
DIFF PNL FLD: NEGATIVE — SIGNIFICANT CHANGE UP
EGFR: 72 ML/MIN/1.73M2 — SIGNIFICANT CHANGE UP
EGFR: 72 ML/MIN/1.73M2 — SIGNIFICANT CHANGE UP
EOSINOPHIL # BLD AUTO: 0 K/UL — SIGNIFICANT CHANGE UP (ref 0–0.5)
EOSINOPHIL NFR BLD AUTO: 0 % — SIGNIFICANT CHANGE UP (ref 0–6)
FLUAV AG NPH QL: SIGNIFICANT CHANGE UP
FLUBV AG NPH QL: SIGNIFICANT CHANGE UP
GLUCOSE SERPL-MCNC: 91 MG/DL — SIGNIFICANT CHANGE UP (ref 70–99)
GLUCOSE UR QL: NEGATIVE MG/DL — SIGNIFICANT CHANGE UP
HCT VFR BLD CALC: 44.2 % — SIGNIFICANT CHANGE UP (ref 39–50)
HGB BLD-MCNC: 15.8 G/DL — SIGNIFICANT CHANGE UP (ref 13–17)
IMM GRANULOCYTES NFR BLD AUTO: 0.3 % — SIGNIFICANT CHANGE UP (ref 0–0.9)
INR BLD: 1.24 RATIO — HIGH (ref 0.85–1.16)
KETONES UR QL: 80 MG/DL
LACTATE SERPL-SCNC: 1.1 MMOL/L — SIGNIFICANT CHANGE UP (ref 0.7–2)
LEUKOCYTE ESTERASE UR-ACNC: NEGATIVE — SIGNIFICANT CHANGE UP
LYMPHOCYTES # BLD AUTO: 1.8 K/UL — SIGNIFICANT CHANGE UP (ref 1–3.3)
LYMPHOCYTES # BLD AUTO: 18.4 % — SIGNIFICANT CHANGE UP (ref 13–44)
MCHC RBC-ENTMCNC: 29.5 PG — SIGNIFICANT CHANGE UP (ref 27–34)
MCHC RBC-ENTMCNC: 35.7 G/DL — SIGNIFICANT CHANGE UP (ref 32–36)
MCV RBC AUTO: 82.5 FL — SIGNIFICANT CHANGE UP (ref 80–100)
MONOCYTES # BLD AUTO: 1.28 K/UL — HIGH (ref 0–0.9)
MONOCYTES NFR BLD AUTO: 13.1 % — SIGNIFICANT CHANGE UP (ref 2–14)
NEUTROPHILS # BLD AUTO: 6.66 K/UL — SIGNIFICANT CHANGE UP (ref 1.8–7.4)
NEUTROPHILS NFR BLD AUTO: 67.9 % — SIGNIFICANT CHANGE UP (ref 43–77)
NITRITE UR-MCNC: NEGATIVE — SIGNIFICANT CHANGE UP
NRBC BLD AUTO-RTO: 0 /100 WBCS — SIGNIFICANT CHANGE UP (ref 0–0)
PH UR: 6 — SIGNIFICANT CHANGE UP (ref 5–8)
PLATELET # BLD AUTO: 188 K/UL — SIGNIFICANT CHANGE UP (ref 150–400)
POTASSIUM SERPL-MCNC: 3.6 MMOL/L — SIGNIFICANT CHANGE UP (ref 3.5–5.3)
POTASSIUM SERPL-SCNC: 3.6 MMOL/L — SIGNIFICANT CHANGE UP (ref 3.5–5.3)
PROT SERPL-MCNC: 8.6 G/DL — HIGH (ref 6–8.3)
PROT UR-MCNC: 100 MG/DL
PROTHROM AB SERPL-ACNC: 14.6 SEC — HIGH (ref 9.9–13.4)
RBC # BLD: 5.36 M/UL — SIGNIFICANT CHANGE UP (ref 4.2–5.8)
RBC # FLD: 11.9 % — SIGNIFICANT CHANGE UP (ref 10.3–14.5)
RSV RNA NPH QL NAA+NON-PROBE: SIGNIFICANT CHANGE UP
SARS-COV-2 RNA SPEC QL NAA+PROBE: SIGNIFICANT CHANGE UP
SODIUM SERPL-SCNC: 132 MMOL/L — LOW (ref 135–145)
SOURCE RESPIRATORY: SIGNIFICANT CHANGE UP
SP GR SPEC: 1.03 — HIGH (ref 1–1.03)
UROBILINOGEN FLD QL: 1 MG/DL — SIGNIFICANT CHANGE UP (ref 0.2–1)
WBC # BLD: 9.8 K/UL — SIGNIFICANT CHANGE UP (ref 3.8–10.5)
WBC # FLD AUTO: 9.8 K/UL — SIGNIFICANT CHANGE UP (ref 3.8–10.5)

## 2025-06-07 PROCEDURE — 99223 1ST HOSP IP/OBS HIGH 75: CPT | Mod: GC

## 2025-06-07 PROCEDURE — 99285 EMERGENCY DEPT VISIT HI MDM: CPT

## 2025-06-07 PROCEDURE — 71046 X-RAY EXAM CHEST 2 VIEWS: CPT | Mod: 26

## 2025-06-07 PROCEDURE — 74177 CT ABD & PELVIS W/CONTRAST: CPT | Mod: 26

## 2025-06-07 RX ORDER — IOHEXOL 350 MG/ML
30 INJECTION, SOLUTION INTRAVENOUS ONCE
Refills: 0 | Status: COMPLETED | OUTPATIENT
Start: 2025-06-07 | End: 2025-06-07

## 2025-06-07 RX ORDER — SODIUM CHLORIDE 9 G/1000ML
2200 INJECTION, SOLUTION INTRAVENOUS ONCE
Refills: 0 | Status: COMPLETED | OUTPATIENT
Start: 2025-06-07 | End: 2025-06-07

## 2025-06-07 RX ORDER — ACETAMINOPHEN 500 MG/5ML
1000 LIQUID (ML) ORAL ONCE
Refills: 0 | Status: COMPLETED | OUTPATIENT
Start: 2025-06-07 | End: 2025-06-07

## 2025-06-07 RX ORDER — CEFEPIME 2 G/20ML
2000 INJECTION, POWDER, FOR SOLUTION INTRAVENOUS ONCE
Refills: 0 | Status: COMPLETED | OUTPATIENT
Start: 2025-06-07 | End: 2025-06-07

## 2025-06-07 RX ORDER — ONDANSETRON HCL/PF 4 MG/2 ML
4 VIAL (ML) INJECTION ONCE
Refills: 0 | Status: COMPLETED | OUTPATIENT
Start: 2025-06-07 | End: 2025-06-07

## 2025-06-07 RX ADMIN — IOHEXOL 30 MILLILITER(S): 350 INJECTION, SOLUTION INTRAVENOUS at 19:08

## 2025-06-07 RX ADMIN — Medication 400 MILLIGRAM(S): at 19:07

## 2025-06-07 RX ADMIN — SODIUM CHLORIDE 2200 MILLILITER(S): 9 INJECTION, SOLUTION INTRAVENOUS at 19:07

## 2025-06-07 RX ADMIN — CEFEPIME 100 MILLIGRAM(S): 2 INJECTION, POWDER, FOR SOLUTION INTRAVENOUS at 19:07

## 2025-06-07 RX ADMIN — Medication 20 MILLIGRAM(S): at 19:41

## 2025-06-07 NOTE — ED ADULT NURSE NOTE - NSFALLUNIVINTERV_ED_ALL_ED
Bed/Stretcher in lowest position, wheels locked, appropriate side rails in place/Call bell, personal items and telephone in reach/Instruct patient to call for assistance before getting out of bed/chair/stretcher/Non-slip footwear applied when patient is off stretcher/[place] to call system/Physically safe environment - no spills, clutter or unnecessary equipment/Purposeful proactive rounding/Room/bathroom lighting operational, light cord in reach

## 2025-06-07 NOTE — ED ADULT NURSE NOTE - CHIEF COMPLAINT QUOTE
I have gas pains in my stomach that wont release and I have a headache.  I have flu like symptoms because I have body aches / chills, etc.  I went to urgent care today and they recommended that I come here.  this has been going on for 3-5 days.  I have no traveled anywhere [place].  denies smoking or drug use, but I do drink socially.  Urgent care did do a swab on me and I was told it was negative  I did not take anything for my headache or other symptoms.  I took a smoothie but that did not help.  I have not taken any medication since symptoms started  I sometimes feel like I am not coherent either  I do have some burning with urination and It dribbles at the end.

## 2025-06-07 NOTE — ED PROVIDER NOTE - PROGRESS NOTE DETAILS
I discussed with patient who is not concerned for any sexually transmitted infections such as gonorrhea or chlamydia at this time.  Yehuda Pantoja MD.

## 2025-06-07 NOTE — ED ADULT TRIAGE NOTE - AS TEMP SITE
Fax received from pharmacy requesting refill of buproprion XL  Last fill - 9/6/22  Last appt 8/8/22    Medication pended for review   Pharmacy confirmed    oral

## 2025-06-07 NOTE — ED ADULT NURSE NOTE - OBJECTIVE STATEMENT
A&Ox4 from triage with c/o fever body aches and abdominal pain x 3-5 days. says he feels like he is constipated and having gas pains and burping excessively. denies N/V/D. skin warm and dry. didn't take motrin/tylenol today. tmax 103.1 at home.

## 2025-06-07 NOTE — ED PROVIDER NOTE - OBJECTIVE STATEMENT
Patient with no significant past medical history is presenting for fever, abdominal discomfort as well as dysuria.  States for the past few days he has been feeling unwell with some abdominal discomfort as well as increased gas and bloating.  Was seen at urgent care today and told to come to ED for evaluation.  Patient without associated chest pain or shortness of breath.  No cough.  No change in bowel movement.  States that he has had some dysuria as well.  Endorsing headache without neck pain or neck stiffness.  Reports that he does have known sick contacts.

## 2025-06-07 NOTE — ED ADULT NURSE NOTE - MUSCULOSKELETAL ASSESSMENT
Berenice Roque had odynophagia related to a recent URI. Her flexible laryngoscopy exam is normal. There are no concerning masses or lesions. I encouraged her to not start smoking again. She should RTC if her symptoms return. Questions answered.      We discussed the symptoms of head and neck cancer -  including, but not limited to, swelling in the neck, changes in voice and swallowing, and pain.  She was instructed to call our office if she experience any of these problems for over 3 weeks.    
- - -

## 2025-06-07 NOTE — ED ADULT NURSE REASSESSMENT NOTE - NS ED NURSE REASSESS COMMENT FT1
Report received from Kyra ROWLAND. Pt lying awake in stretcher A&O4. Pt complains of minimal tolerable pain at this time. No other signs of distress noted. Plan of care continued.

## 2025-06-07 NOTE — ED PROVIDER NOTE - CLINICAL SUMMARY MEDICAL DECISION MAKING FREE TEXT BOX
Patient called today with regards to the following request:  Provider: Hesham Christopher MD  Medication: vilazodone HCl (VIIBRYD)   Strength: 20 MG Tab   Supply Requested:   Pharmacy:     [id number] - [place], [place] - [address] AT [facility]  [address] [place] [address]  Phone: [phone] Fax: [phone]    Additional question-Patient calling with questions/issues in regards to the following medication/s:  Unable to fill phentermine 37.5 MG capsule  Patient having the following symptoms/issues: n/a    For additional information, or if you need to contact the patient, please call patient at the following number:    Contact Phone Number:     Mobile [phone]       Patient states that it is okay to leave a detailed message.    Patient was instructed to call pharmacy directly for future refills.      Advised Patient that refill processing may take 48-72 hours.      I   On exam he has generalized abdominal tenderness palpation.  He is febrile and tachycardic.  Suspect possible viral pathology though with his abdominal discomfort, fever and tachycardia, will plan to evaluate for possible appendicitis.  He does not have localizing right lower quadrant pain.  However he does have generalized abdominal pain.  Will check for UTI as well given his dysuria.  Will check for viral pathology as well.  Plan on lab work, imaging, medications and reassessment. will check for gonorrhea and chlamydia as well. On exam he has generalized abdominal tenderness palpation.  He is febrile and tachycardic.  Suspect possible viral pathology though with his abdominal discomfort, fever and tachycardia, will plan to evaluate for possible appendicitis.  He does not have localizing right lower quadrant pain.  However he does have generalized abdominal pain.  Will check for UTI as well given his dysuria.  Will check for viral pathology as well. no neck pain or neck stiffness to suggest meningitis, pt ambulatory in the ed.  Plan on lab work, imaging, medications and reassessment. will check for gonorrhea and chlamydia as well.

## 2025-06-07 NOTE — ED PROVIDER NOTE - PHYSICAL EXAMINATION
Constitutional: Awake, Alert, non-toxic. No acute distress.  HEAD: Normocephalic, atraumatic.   EYES: PERRL, EOM intact, conjunctiva and sclera are clear bilaterally.  ENT: External ears normal. No rhinorrhea, no tracheal deviation   NECK: Supple, non-tender, FROM of neck  CARDIOVASCULAR: tachycardic rate and rhythm.  RESPIRATORY: Normal respiratory effort; breath sounds CTAB, no wheezes or rales. Speaking in full sentences. No accessory muscle use.   ABDOMEN: Soft; generalized abd TTP without localized RLQ, non-distended. No rebound or guarding.   MSK:  no lower extremity edema, no deformities  SKIN: Warm, dry  NEURO: A&O x3. Sensory and motor functions are grossly intact. Speech is normal. No facial droop.

## 2025-06-08 LAB
ALBUMIN SERPL ELPH-MCNC: 3 G/DL — LOW (ref 3.3–5)
ALP SERPL-CCNC: 78 U/L — SIGNIFICANT CHANGE UP (ref 40–120)
ALT FLD-CCNC: 31 U/L — SIGNIFICANT CHANGE UP (ref 12–78)
ANION GAP SERPL CALC-SCNC: 7 MMOL/L — SIGNIFICANT CHANGE UP (ref 5–17)
AST SERPL-CCNC: 18 U/L — SIGNIFICANT CHANGE UP (ref 15–37)
BILIRUB SERPL-MCNC: 0.7 MG/DL — SIGNIFICANT CHANGE UP (ref 0.2–1.2)
BUN SERPL-MCNC: 13 MG/DL — SIGNIFICANT CHANGE UP (ref 7–23)
CALCIUM SERPL-MCNC: 8.3 MG/DL — LOW (ref 8.5–10.1)
CHLORIDE SERPL-SCNC: 103 MMOL/L — SIGNIFICANT CHANGE UP (ref 96–108)
CO2 SERPL-SCNC: 26 MMOL/L — SIGNIFICANT CHANGE UP (ref 22–31)
CREAT SERPL-MCNC: 1.3 MG/DL — SIGNIFICANT CHANGE UP (ref 0.5–1.3)
CRP SERPL-MCNC: 162 MG/L — HIGH
CULTURE RESULTS: NO GROWTH — SIGNIFICANT CHANGE UP
EGFR: 79 ML/MIN/1.73M2 — SIGNIFICANT CHANGE UP
EGFR: 79 ML/MIN/1.73M2 — SIGNIFICANT CHANGE UP
ERYTHROCYTE [SEDIMENTATION RATE] IN BLOOD: 23 MM/HR — HIGH (ref 0–15)
GLUCOSE SERPL-MCNC: 102 MG/DL — HIGH (ref 70–99)
HCT VFR BLD CALC: 38.3 % — LOW (ref 39–50)
HGB BLD-MCNC: 13.9 G/DL — SIGNIFICANT CHANGE UP (ref 13–17)
MCHC RBC-ENTMCNC: 29.7 PG — SIGNIFICANT CHANGE UP (ref 27–34)
MCHC RBC-ENTMCNC: 36.3 G/DL — HIGH (ref 32–36)
MCV RBC AUTO: 81.8 FL — SIGNIFICANT CHANGE UP (ref 80–100)
NRBC BLD AUTO-RTO: 0 /100 WBCS — SIGNIFICANT CHANGE UP (ref 0–0)
PLATELET # BLD AUTO: 169 K/UL — SIGNIFICANT CHANGE UP (ref 150–400)
POTASSIUM SERPL-MCNC: 3.4 MMOL/L — LOW (ref 3.5–5.3)
POTASSIUM SERPL-SCNC: 3.4 MMOL/L — LOW (ref 3.5–5.3)
PROT SERPL-MCNC: 7.2 G/DL — SIGNIFICANT CHANGE UP (ref 6–8.3)
RBC # BLD: 4.68 M/UL — SIGNIFICANT CHANGE UP (ref 4.2–5.8)
RBC # FLD: 11.9 % — SIGNIFICANT CHANGE UP (ref 10.3–14.5)
SODIUM SERPL-SCNC: 136 MMOL/L — SIGNIFICANT CHANGE UP (ref 135–145)
SPECIMEN SOURCE: SIGNIFICANT CHANGE UP
WBC # BLD: 8.44 K/UL — SIGNIFICANT CHANGE UP (ref 3.8–10.5)
WBC # FLD AUTO: 8.44 K/UL — SIGNIFICANT CHANGE UP (ref 3.8–10.5)

## 2025-06-08 PROCEDURE — 99222 1ST HOSP IP/OBS MODERATE 55: CPT

## 2025-06-08 PROCEDURE — 99233 SBSQ HOSP IP/OBS HIGH 50: CPT

## 2025-06-08 PROCEDURE — 93010 ELECTROCARDIOGRAM REPORT: CPT

## 2025-06-08 RX ORDER — PIPERACILLIN-TAZO-DEXTROSE,ISO 3.375G/5
3.38 IV SOLUTION, PIGGYBACK PREMIX FROZEN(ML) INTRAVENOUS EVERY 8 HOURS
Refills: 0 | Status: DISCONTINUED | OUTPATIENT
Start: 2025-06-08 | End: 2025-06-10

## 2025-06-08 RX ORDER — ACETAMINOPHEN 500 MG/5ML
650 LIQUID (ML) ORAL EVERY 6 HOURS
Refills: 0 | Status: DISCONTINUED | OUTPATIENT
Start: 2025-06-08 | End: 2025-06-12

## 2025-06-08 RX ORDER — PIPERACILLIN-TAZO-DEXTROSE,ISO 3.375G/5
3.38 IV SOLUTION, PIGGYBACK PREMIX FROZEN(ML) INTRAVENOUS ONCE
Refills: 0 | Status: COMPLETED | OUTPATIENT
Start: 2025-06-08 | End: 2025-06-08

## 2025-06-08 RX ADMIN — Medication 100 MILLILITER(S): at 00:50

## 2025-06-08 RX ADMIN — Medication 25 GRAM(S): at 21:36

## 2025-06-08 RX ADMIN — Medication 650 MILLIGRAM(S): at 09:25

## 2025-06-08 RX ADMIN — Medication 650 MILLIGRAM(S): at 03:14

## 2025-06-08 RX ADMIN — Medication 200 GRAM(S): at 00:28

## 2025-06-08 RX ADMIN — Medication 650 MILLIGRAM(S): at 04:53

## 2025-06-08 RX ADMIN — Medication 25 GRAM(S): at 14:52

## 2025-06-08 RX ADMIN — Medication 25 GRAM(S): at 03:15

## 2025-06-08 RX ADMIN — Medication 650 MILLIGRAM(S): at 20:28

## 2025-06-08 RX ADMIN — Medication 100 MILLILITER(S): at 12:43

## 2025-06-08 NOTE — H&P ADULT - NSHPREVIEWOFSYSTEMS_GEN_ALL_CORE
CONSTITUTIONAL: ENDORSES fever, chills, fatigue, weakness, occipital pain  HEENT: denies blurred vision, sore throat  SKIN: denies new lesions, rash  CARDIOVASCULAR: denies chest pain, chest pressure, palpitations  RESPIRATORY: denies shortness of breath, sputum production  GASTROINTESTINAL: denies nausea, vomiting, diarrhea. ENDORSES diffuse gas like abdominal pain.  GENITOURINARY: endorses dysuria  NEUROLOGICAL: denies numbness, headache, focal weakness  MUSCULOSKELETAL: endorses muscle aches and back pain  HEMATOLOGIC: denies gross bleeding, bruising  PSYCHIATRIC: denies recent changes in anxiety, depression

## 2025-06-08 NOTE — H&P ADULT - ASSESSMENT
25 y M w/ no PMH presents to ED due to fever, chills, body aches. Admitted for GI evaluation for CT imaging findings consistent with terminal ileitis concerning for infectious eitology vs Crohn's dx.    # Fever  likely 2/2 intraabdominal infection  Tmax of 103.  Chlamydia/ GC sent as patient is having dysuria. UA negative for UTI  CT imaging concerning for terminal ileitis  IV abx-> given one dose of Cefepime in ED; will broaden to cover anaerobes and start zosyn q8  Monitor temperature and wbc curve  follow blood cx x2  GI consulted by ED; fu recs  ESR/CRP, fecal calprotectin, GI PCR ordered to assess for inflammatory bowel dx vs infectious etiology    #LAUREN  likely pre-renal secondary to dec PO intake given he has been sick for last few days-> will start NS at 100 cc/hr for 12 hrs and reassess  monitor renal indices  avoid nephrotoxic agents    #Hyponatremia  Na 132; likely hypovolemic hyponatremia secondary to dec PO intake from illness  NS at 100 cchr for 12 hrs  repeat CMP ordered for AM    #DVT prophylaxis  encourage ambulation, young 25 M w/ no PMH   25 y M w/ no PMH presents to ED due to fever, chills, body aches. Admitted for GI evaluation for CT imaging findings consistent with terminal ileitis concerning for infectious eitology vs Crohn's dx.    # Fever  likely 2/2 intraabdominal infection  Tmax of 103.  Chlamydia/ GC sent as patient is having dysuria. UA negative for UTI  CT imaging concerning for terminal ileitis  IV abx-> given one dose of Cefepime in ED; will broaden to cover anaerobes and start zosyn q8  Monitor temperature and wbc curve  follow blood cx x2  GI consulted by ED; fu recs  ID consulted; Dr. Walker,  recs.  ESR/CRP, fecal calprotectin, GI PCR ordered to assess for inflammatory bowel dx vs infectious etiology    #LAUREN  likely pre-renal secondary to dec PO intake given he has been sick for last few days-> will start NS at 100 cc/hr for 12 hrs and reassess  monitor renal indices  avoid nephrotoxic agents    #Hyponatremia  Na 132; likely hypovolemic hyponatremia secondary to dec PO intake from illness  NS at 100 cchr for 12 hrs  repeat CMP ordered for AM    #DVT prophylaxis  encourage ambulation, young 25 M w/ no PMH

## 2025-06-08 NOTE — CONSULT NOTE ADULT - ASSESSMENT
terminal ileal thickening  abdominal pain  fever    cont diet as tolerated  check fecal calprotectin  check GI pcr  esr/crp/asca/anca  if GI pcr negative may need colonoscopy  cont antibiotics  f/u cultures  d/w patient and family  
25-year-old male with no prior medical history.  Patient presenting with fever abdominal pain.  Reports abdominal pain for past 5 days associated with constipation relieved with MiraLAX.  Started having fevers 3 days prior to presentation.  In the emergency room patient afebrile with no leukocytosis.  CT AP reporting terminal ileitis.  Patient started on Zosyn.  Infectious diseases evaluation requested.      Terminal ileitis     - Blood cultures  pending  - GI PCR pending  - Calprotectin stool pending  - GC/Chlamydia pending   - RVP/COVID 19 PCR 6/7 negative   - CT AP reporting terminal ileitis.   - UA negative for UTI   -   - Continue Zosyn  - Follow up cultures  - Trend Fever  - Trend WBC      Thank you for allowing me to participate in the care of your patient.   Will Follow    Discussed treatment plan with: Dr Timmons

## 2025-06-08 NOTE — PROGRESS NOTE ADULT - SUBJECTIVE AND OBJECTIVE BOX
Patient is a 25y old  Male who presents with a chief complaint of fevers/chills/ body ache/ diffuse gas like pain in abdomen (08 Jun 2025 13:41)      Patient seen and examined at bedside. No events overnight but with fever this morning. States he feels better, abd pain improved as well. Denies chest pain, sob, fever at this time, nausea or vomiting, diarrhea, had BM this morning    ALLERGIES:  No Known Allergies    MEDICATIONS  (STANDING):  piperacillin/tazobactam IVPB.. 3.375 Gram(s) IV Intermittent every 8 hours  sodium chloride 0.9%. 1000 milliLiter(s) (100 mL/Hr) IV Continuous <Continuous>    MEDICATIONS  (PRN):  acetaminophen     Tablet .. 650 milliGRAM(s) Oral every 6 hours PRN Temp greater or equal to 38C (100.4F), Mild Pain (1 - 3)    Vital Signs Last 24 Hrs  T(F): 98.8 (08 Jun 2025 12:05), Max: 103.1 (08 Jun 2025 03:13)  HR: 87 (08 Jun 2025 12:05) (66 - 102)  BP: 120/71 (08 Jun 2025 12:05) (105/69 - 120/71)  RR: 18 (08 Jun 2025 12:05) (18 - 20)  SpO2: 96% (08 Jun 2025 12:05) (96% - 99%)  I&O's Summary      PHYSICAL EXAM:  GENERAL: NAD, well-groomed, well-developed  HEAD:  Atraumatic, Normocephalic  EYES: PEERL, conjunctiva and sclera clear  ENMT: Moist mucous membranes, Supple, No JVD  CHEST/LUNG: Clear to auscultation bilaterally, good air entry, non-labored breathing  HEART: RRR; S1/S2, No murmur  ABDOMEN: Soft, Nontender, Nondistended; Bowel sounds present  EXTREMITIES: No calf tenderness, No cyanosis, No edema  SKIN: Warm, perfused  PSYCH: Normal mood, Normal affect  NERVOUS SYSTEM:  A/O x3, Good concentration    LABS:                        13.9   8.44  )-----------( 169      ( 08 Jun 2025 05:06 )             38.3     06-08    136  |  103  |  13  ----------------------------<  102  3.4   |  26  |  1.30    Ca    8.3      08 Jun 2025 05:06    TPro  7.2  /  Alb  3.0  /  TBili  0.7  /  DBili  x   /  AST  18  /  ALT  31  /  AlkPhos  78  06-08      PT/INR - ( 07 Jun 2025 19:00 )   PT: 14.6 sec;   INR: 1.24 ratio         PTT - ( 07 Jun 2025 19:00 )  PTT:30.1 sec  Lactate, Blood: 1.1 mmol/L (06-07 @ 19:00)                          Urinalysis Basic - ( 08 Jun 2025 05:06 )    Color: x / Appearance: x / SG: x / pH: x  Gluc: 102 mg/dL / Ketone: x  / Bili: x / Urobili: x   Blood: x / Protein: x / Nitrite: x   Leuk Esterase: x / RBC: x / WBC x   Sq Epi: x / Non Sq Epi: x / Bacteria: x            RADIOLOGY & ADDITIONAL TESTS:    Care Discussed with Consultants/Other Providers:

## 2025-06-08 NOTE — H&P ADULT - HISTORY OF PRESENT ILLNESS
Patient with no significant past medical history is presenting for fever, abdominal discomfort. He has been having symptoms of abdominal discomfort, describing it as gas- like and bloating for last 5 days and intermittent fevers for last 3 days. Was seen at urgent care today, and had a temperature of 103 and told to come to ED for evaluation.  Patient without associated chest pain or shortness of breath.  No cough.  No change in bowel movement.  States that he has had some dysuria.  Endorsing headache without neck pain or neck stiffness.  Reports that he does have known sick contacts.    Vitals- T 99 /71  HR 79 RR 18 O2S 98% on RA  Significant Labs- CBC- WBC- 9.80  Hgb 15.8  Hct 44.2  Plt 188  CMP: Na 132  K 3.6  Cl 98  CO2 23  BUN 15 Cr 1.40 Glucose 91  eGFR 72  Lactate 1.1  UA- neg for UTI  RVP negative  Imaging: CT A/P     Patient with no significant past medical history is presenting for fever, abdominal discomfort. He has been having symptoms of abdominal discomfort, describing it as gas- like and bloating for last 5 days and intermittent fevers for last 3 days. Was seen at urgent care today, and had a temperature of 103 and told to come to ED for evaluation.  Patient without associated chest pain or shortness of breath.  No cough.  No change in bowel movement.  States that he has had some dysuria.  Endorsing headache without neck pain or neck stiffness.  Reports that he does have known sick contacts.    Vitals- T 99 /71  HR 79 RR 18 O2S 98% on RA  Significant Labs- CBC- WBC- 9.80  Hgb 15.8  Hct 44.2  Plt 188  CMP: Na 132  K 3.6  Cl 98  CO2 23  BUN 15 Cr 1.40 Glucose 91  eGFR 72  Lactate 1.1  UA- neg for UTI  RVP negative  Imaging: CT A/P w/ oral cont and w/ IV contrast-  Findings consistent with terminal ileitis. Laboratory correlation for underlying inflammatory (i.e. Crohn's disease) versus infectious etiologies recommended.

## 2025-06-08 NOTE — PROGRESS NOTE ADULT - ASSESSMENT
25 y M w/ no PMH presents to ED due to fever, chills, body aches. Admitted for GI evaluation for CT imaging findings consistent with terminal ileitis concerning for infectious eitology vs Crohn's dx.    #Fever  #abd pain  - likely 2/2 intraabdominal infection, no family history of IBD  - CT imaging concerning for terminal ileitis  - UA ngeative  - continue zosyn for now  - follow up BCx  - follow up Chlamydia/ G  - follow up GI PCR- if negative might need colonoscopy   - follow up fecal calprotectin  - discussed with ID, Dr. Brunson  - discussed with GI, Dr. France    #LAUREN  - likely pre-renal secondary to dec PO intake given he has been sick for last few days  - continue IVF    #Hyponatremia  - likely hypovolemic hyponatremia secondary to dec PO intake from illness  - continue IVF  - improved    #DVT prophylaxis  encourage ambulation    updated father at bedside, all questions answered

## 2025-06-08 NOTE — H&P ADULT - NSHPPHYSICALEXAM_GEN_ALL_CORE
Physical Examination: Constitutional: Awake, Alert, non-toxic. No acute distress.  HEAD: Normocephalic, atraumatic.   EYES: PERRL, EOM intact, conjunctiva and sclera are clear bilaterally.  ENT: External ears normal. No rhinorrhea, no tracheal deviation   NECK: Supple, non-tender, FROM of neck  CARDIOVASCULAR: regular rate and rhythm.  RESPIRATORY: Normal respiratory effort; breath sounds CTAB, no wheezes or rales. Speaking in full sentences. No accessory muscle use.   ABDOMEN: Soft; nondistended, no tenderness to palpation in all 4 quadrants, non-distended. No rebound or guarding.   MSK:  no lower extremity edema, no deformities  SKIN: Warm, dry  NEURO: A&O x3. Sensory and motor functions are grossly intact. Speech is normal.

## 2025-06-08 NOTE — CONSULT NOTE ADULT - SUBJECTIVE AND OBJECTIVE BOX
[facility]    Igor Arnulfo Manning NP    [address] [place] [address] [phone]      Chief Complaint:  Patient is a 25y old  Male who presents with a chief complaint of fevers/chills/ body ache/ diffuse gas like pain in abdomen (08 Jun 2025 00:08)      HPI:Patient with no significant past medical history is presenting for fever, abdominal discomfort. He has been having symptoms of abdominal discomfort, describing it as gas- like and bloating for last 5 days and intermittent fevers for last 3 days. Was seen at urgent care today, and had a temperature of 103 and told to come to ED for evaluation.  Patient without associated chest pain or shortness of breath.  No cough.  No change in bowel movement.  States that he has had some dysuria.  Endorsing headache without neck pain or neck stiffness.  Reports that he does have known sick contacts.      Allergies:  No Known Allergies      Medications:  acetaminophen     Tablet .. 650 milliGRAM(s) Oral every 6 hours PRN  piperacillin/tazobactam IVPB.. 3.375 Gram(s) IV Intermittent every 8 hours  sodium chloride 0.9%. 1000 milliLiter(s) IV Continuous <Continuous>      PMHX/PSHX:      Family history:      Social History:     ROS:     General:  No wt loss, fevers, chills, night sweats, fatigue,   Eyes:  Good vision, no reported pain  ENT:  No sore throat, pain, runny nose, dysphagia  CV:  No pain, palpitations, hypo/hypertension  Resp:  No dyspnea, cough, tachypnea, wheezing  GI:  No pain, No nausea, No vomiting, No diarrhea, No constipation, No weight loss, No fever, No pruritis, No rectal bleeding, No tarry stools, No dysphagia,  :  No pain, bleeding, incontinence, nocturia  Muscle:  No pain, weakness  Neuro:  No weakness, tingling, memory problems  Psych:  No fatigue, insomnia, mood problems, depression  Endocrine:  No polyuria, polydipsia, cold/heat intolerance  Heme:  No petechiae, ecchymosis, easy bruisability  Skin:  No rash, tattoos, scars, edema      PHYSICAL EXAM:   Vital Signs:  Vital Signs Last 24 Hrs  T(C): 37.1 (08 Jun 2025 12:05), Max: 39.5 (08 Jun 2025 03:13)  T(F): 98.8 (08 Jun 2025 12:05), Max: 103.1 (08 Jun 2025 03:13)  HR: 87 (08 Jun 2025 12:05) (66 - 102)  BP: 120/71 (08 Jun 2025 12:05) (105/69 - 120/71)  BP(mean): --  RR: 18 (08 Jun 2025 12:05) (18 - 20)  SpO2: 96% (08 Jun 2025 12:05) (96% - 99%)    Parameters below as of 08 Jun 2025 12:05  Patient On (Oxygen Delivery Method): room air      Daily Height in cm: 175.26 (07 Jun 2025 16:28)    Daily     GENERAL:  Appears stated age, well-groomed, well-nourished, no distress  HEENT:  NC/AT,  conjunctivae clear and pink, no thyromegaly, nodules, adenopathy, no JVD, sclera -anicteric  CHEST:  Full & symmetric excursion, no increased effort, breath sounds clear  HEART:  Regular rhythm, S1, S2, no murmur/rub/S3/S4, no abdominal bruit, no edema  ABDOMEN:  Soft, non-tender, non-distended, normoactive bowel sounds,  no masses ,no hepato-splenomegaly, no signs of chronic liver disease  EXTEREMITIES:  no cyanosis,clubbing or edema  SKIN:  No rash/erythema/ecchymoses/petechiae/wounds/abscess/warm/dry  NEURO:  Alert, oriented, no asterixis, no tremor, no encephalopathy    LABS:                        13.9   8.44  )-----------( 169      ( 08 Jun 2025 05:06 )             38.3     06-08    136  |  103  |  13  ----------------------------<  102[H]  3.4[L]   |  26  |  1.30    Ca    8.3[L]      08 Jun 2025 05:06    TPro  7.2  /  Alb  3.0[L]  /  TBili  0.7  /  DBili  x   /  AST  18  /  ALT  31  /  AlkPhos  78  06-08    LIVER FUNCTIONS - ( 08 Jun 2025 05:06 )  Alb: 3.0 g/dL / Pro: 7.2 g/dL / ALK PHOS: 78 U/L / ALT: 31 U/L / AST: 18 U/L / GGT: x           PT/INR - ( 07 Jun 2025 19:00 )   PT: 14.6 sec;   INR: 1.24 ratio         PTT - ( 07 Jun 2025 19:00 )  PTT:30.1 sec  Urinalysis Basic - ( 08 Jun 2025 05:06 )    Color: x / Appearance: x / SG: x / pH: x  Gluc: 102 mg/dL / Ketone: x  / Bili: x / Urobili: x   Blood: x / Protein: x / Nitrite: x   Leuk Esterase: x / RBC: x / WBC x   Sq Epi: x / Non Sq Epi: x / Bacteria: x          Imaging:              
[facility]  INFECTIOUS DISEASES   [address] [place] [address]  Tel: [phone]     Fax: [phone]  MD Kady Galeana MD Shah, Kaushal, MD Sunjit, Jaspal, MD  ========================================================  Weekend Coverage  =======================================================      [id number]  PIEDAD WELDON    CC: Patient is a 25y old  Male who presents with a chief complaint of fevers/chills/ body ache/ diffuse gas like pain in abdomen (08 Jun 2025 12:29)      25-year-old male with no prior medical history.  Patient presenting with fever abdominal pain.  Reports abdominal pain for past 5 days associated with constipation relieved with MiraLAX.  Started having fevers 3 days prior to presentation.  In the emergency room patient afebrile with no leukocytosis.  CT AP reporting terminal ileitis.  Patient started on Zosyn.  Infectious diseases evaluation requested.      Past Medical & Surgical Hx:  No Medical or surgical history      Social Hx:  Denies smoking, ETOH or drugs      FAMILY HISTORY:  Prostate Ca- Father       Allergies    No Known Allergies    Intolerances          REVIEW OF SYSTEMS:  CONSTITUTIONAL:  No Fever or chills  HEENT:  No diplopia or blurred vision.  No earache, sore throat or runny nose.  CARDIOVASCULAR:  No chest pain  RESPIRATORY:  No cough, shortness of breath  GASTROINTESTINAL:  No nausea, vomiting or diarrhea.  GENITOURINARY:  No dysuria, frequency or urgency. No Blood in urine  MUSCULOSKELETAL:  no joint aches, no muscle pain  SKIN:  No change in skin, hair or nails.  NEUROLOGIC:  No Headaches      Physical Exam:  GEN: NAD  HEENT: normocephalic and atraumatic. EOMI. PERRL.    NECK: Supple.   LUNGS: CTA B/L.  HEART: RRR  ABDOMEN: Soft, NT, ND.  +BS.    : No CVA tenderness  EXTREMITIES: Without  edema.  MSK: No joint swelling  NEUROLOGIC: No Focal Deficits   SKIN: No rash      Height (cm): 175.3 (06-07 @ 16:28)  Weight (kg): 79.4 (06-07 @ 16:28)  BMI (kg/m2): 25.8 (06-07 @ 16:28)  BSA (m2): 1.95 (06-07 @ 16:28)    Vitals:  T(F): 98.8 (08 Jun 2025 12:05), Max: 103.1 (08 Jun 2025 03:13)  HR: 87 (08 Jun 2025 12:05)  BP: 120/71 (08 Jun 2025 12:05)  RR: 18 (08 Jun 2025 12:05)  SpO2: 96% (08 Jun 2025 12:05) (96% - 99%)  temp max in last 48H T(F): , Max: 103.1 (06-08-25 @ 03:13)    Current Antibiotics:  piperacillin/tazobactam IVPB.. 3.375 Gram(s) IV Intermittent every 8 hours    Other medications:  sodium chloride 0.9%. 1000 milliLiter(s) IV Continuous <Continuous>                            13.9   8.44  )-----------( 169      ( 08 Jun 2025 05:06 )             38.3     06-08    136  |  103  |  13  ----------------------------<  102[H]  3.4[L]   |  26  |  1.30    Ca    8.3[L]      08 Jun 2025 05:06    TPro  7.2  /  Alb  3.0[L]  /  TBili  0.7  /  DBili  x   /  AST  18  /  ALT  31  /  AlkPhos  78  06-08    RECENT CULTURES:      WBC Count: 8.44 K/uL (06-08-25 @ 05:06)  WBC Count: 9.80 K/uL (06-07-25 @ 19:00)    Creatinine: 1.30 mg/dL (06-08-25 @ 05:06)  Creatinine: 1.40 mg/dL (06-07-25 @ 19:00)    C-Reactive Protein: 162 mg/L (06-08-25 @ 05:06)    Sedimentation Rate, Erythrocyte: 23 mm/hr (06-08-25 @ 05:06)       SARS-CoV-2 Result: NotDetec (06-07-25 @ 19:00)

## 2025-06-09 LAB
ALBUMIN SERPL ELPH-MCNC: 3 G/DL — LOW (ref 3.3–5)
ALP SERPL-CCNC: 71 U/L — SIGNIFICANT CHANGE UP (ref 40–120)
ALT FLD-CCNC: 38 U/L — SIGNIFICANT CHANGE UP (ref 12–78)
ANION GAP SERPL CALC-SCNC: 7 MMOL/L — SIGNIFICANT CHANGE UP (ref 5–17)
AST SERPL-CCNC: 25 U/L — SIGNIFICANT CHANGE UP (ref 15–37)
BILIRUB SERPL-MCNC: 0.5 MG/DL — SIGNIFICANT CHANGE UP (ref 0.2–1.2)
BUN SERPL-MCNC: 8 MG/DL — SIGNIFICANT CHANGE UP (ref 7–23)
C TRACH RRNA SPEC QL NAA+PROBE: SIGNIFICANT CHANGE UP
CALCIUM SERPL-MCNC: 9 MG/DL — SIGNIFICANT CHANGE UP (ref 8.5–10.1)
CHLORIDE SERPL-SCNC: 106 MMOL/L — SIGNIFICANT CHANGE UP (ref 96–108)
CO2 SERPL-SCNC: 26 MMOL/L — SIGNIFICANT CHANGE UP (ref 22–31)
CREAT SERPL-MCNC: 1.1 MG/DL — SIGNIFICANT CHANGE UP (ref 0.5–1.3)
EGFR: 96 ML/MIN/1.73M2 — SIGNIFICANT CHANGE UP
EGFR: 96 ML/MIN/1.73M2 — SIGNIFICANT CHANGE UP
GI PCR PANEL: ABNORMAL
GI PCR PANEL: SIGNIFICANT CHANGE UP
GLUCOSE SERPL-MCNC: 84 MG/DL — SIGNIFICANT CHANGE UP (ref 70–99)
HCT VFR BLD CALC: 40.1 % — SIGNIFICANT CHANGE UP (ref 39–50)
HGB BLD-MCNC: 14.2 G/DL — SIGNIFICANT CHANGE UP (ref 13–17)
MCHC RBC-ENTMCNC: 29.6 PG — SIGNIFICANT CHANGE UP (ref 27–34)
MCHC RBC-ENTMCNC: 35.4 G/DL — SIGNIFICANT CHANGE UP (ref 32–36)
MCV RBC AUTO: 83.7 FL — SIGNIFICANT CHANGE UP (ref 80–100)
N GONORRHOEA RRNA SPEC QL NAA+PROBE: SIGNIFICANT CHANGE UP
NOROVIRUS GI+II RNA STL QL NAA+NON-PROBE: ABNORMAL
NRBC BLD AUTO-RTO: 0 /100 WBCS — SIGNIFICANT CHANGE UP (ref 0–0)
PLATELET # BLD AUTO: 176 K/UL — SIGNIFICANT CHANGE UP (ref 150–400)
POTASSIUM SERPL-MCNC: 4.2 MMOL/L — SIGNIFICANT CHANGE UP (ref 3.5–5.3)
POTASSIUM SERPL-SCNC: 4.2 MMOL/L — SIGNIFICANT CHANGE UP (ref 3.5–5.3)
PROT SERPL-MCNC: 7.4 G/DL — SIGNIFICANT CHANGE UP (ref 6–8.3)
RAPID RVP RESULT: SIGNIFICANT CHANGE UP
RBC # BLD: 4.79 M/UL — SIGNIFICANT CHANGE UP (ref 4.2–5.8)
RBC # FLD: 12 % — SIGNIFICANT CHANGE UP (ref 10.3–14.5)
SARS-COV-2 RNA SPEC QL NAA+PROBE: SIGNIFICANT CHANGE UP
SODIUM SERPL-SCNC: 139 MMOL/L — SIGNIFICANT CHANGE UP (ref 135–145)
SPECIMEN SOURCE: SIGNIFICANT CHANGE UP
WBC # BLD: 7.07 K/UL — SIGNIFICANT CHANGE UP (ref 3.8–10.5)
WBC # FLD AUTO: 7.07 K/UL — SIGNIFICANT CHANGE UP (ref 3.8–10.5)

## 2025-06-09 PROCEDURE — 99233 SBSQ HOSP IP/OBS HIGH 50: CPT

## 2025-06-09 RX ORDER — SIMETHICONE 80 MG
80 TABLET,CHEWABLE ORAL ONCE
Refills: 0 | Status: COMPLETED | OUTPATIENT
Start: 2025-06-09 | End: 2025-06-09

## 2025-06-09 RX ADMIN — Medication 80 MILLIGRAM(S): at 23:35

## 2025-06-09 RX ADMIN — Medication 25 GRAM(S): at 05:23

## 2025-06-09 RX ADMIN — Medication 100 MILLILITER(S): at 09:57

## 2025-06-09 RX ADMIN — Medication 25 GRAM(S): at 14:20

## 2025-06-09 RX ADMIN — Medication 25 GRAM(S): at 22:58

## 2025-06-09 NOTE — CARE COORDINATION ASSESSMENT. - NSCAREPROVIDERS_GEN_ALL_CORE_FT
CARE PROVIDERS:  Accepting Physician: Nicho Parikh  Administration: Abdiel Clement  Administration: Raj Alvarez  Administration: Seferino Timmons  Admitting: Nicho Parikh  Attending: Seferino Timmons  Case Management: Augustin Hong  Consultant: Matt France  Consultant: Ayaan Walker  Consultant: Verenice Mc  Consultant: Sofía Helms  Consultant: Kady Galvan  Covering Team: Amber Russ  ED Attending: Yehuda Pantoja  ED Attending2: Syed Espitia  ED Nurse: Grace Flores  HIM/Billing & Coding: Niki Ridley  Infection Control: Feng Whittington  Nurse: Rhonda Lantigua  Nurse: Rosamaria Reyes  Ordered: Doctor, Unknown  Ordered: ServiceAccount, [facility] HospitalMLM  Override: Rhonda Lantigua  Override: Charlotte Allison  Override: Simeon Caballero  PCA/Nursing Assistant: Estelle Mendoza  PCA/Nursing Assistant: Jeni Briscoe  Primary Team: Nicho Parikh  Primary Team: Tomeka Hankins  Primary Team: Matthias Quiroga  Registered Dietitian: Sharla Valerio  Team: [facility], Team  UR// Supp. Assoc.: Ruby Bautista

## 2025-06-09 NOTE — PROGRESS NOTE ADULT - ASSESSMENT
25-year-old male with no prior medical history.  Patient presenting with fever abdominal pain.  Reports abdominal pain for past 5 days associated with constipation relieved with MiraLAX.  Started having fevers 3 days prior to presentation.  In the emergency room patient afebrile with no leukocytosis.  CT AP reporting terminal ileitis.  Patient started on Zosyn.  Infectious diseases evaluation requested.    Unclear if fevers and terminal ileitis is infectious in etiology. GI PCR showed indeterminate norovirus. Had fever last night but feels better today. No leukocytosis. Blood cultures currently no growth.     #Terminal ileitis   #Norovirus indeterminate  #Fevers    -continue Zosyn  -send stool for c diff  -repeat GI PCR pending  -send RVP  -send urinalysis and urine culture  -follow cultures to completion  -monitor fever curve  -discussed with Dr. Iain Galvan MD  Division of infectious Diseases  Cell [phone] between 8am and 6pm  After 6pm and over the weekends please call ID service line at [phone].     55 minutes spent on total encounter assessing patient, examination, chart review, counseling and coordinating care by the attending physician/nurse/care manager.  25-year-old male with no prior medical history.  Patient presenting with fever abdominal pain.  Reports abdominal pain for past 5 days associated with constipation relieved with MiraLAX.  Started having fevers 3 days prior to presentation.  In the emergency room patient afebrile with no leukocytosis.  CT AP reporting terminal ileitis.  Patient started on Zosyn[place] ? [place]. No recent travel. Played golf recently but no other outdoor activities.    Unclear if fevers and terminal ileitis is infectious in etiology. GI PCR showed indeterminate norovirus. Had fever last night but feels better today. No leukocytosis. Blood cultures currently no growth. Urine GC/chlamydia negative,    #Terminal ileitis   #Norovirus indeterminate  #Fevers    -continue Zosyn    -repeat GI PCR pending  -send RVP  -send HIV, Lyme and tick serologies, babesia PCR  -follow cultures to completion  -monitor fever curve  -discussed with Dr. Iain Galvan MD  Division of infectious Diseases  Cell [phone] between 8am and 6pm  After 6pm and over the weekends please call ID service line at [phone].     55 minutes spent on total encounter assessing patient, examination, chart review, counseling and coordinating care by the attending physician/nurse/care manager.

## 2025-06-09 NOTE — PROGRESS NOTE ADULT - ASSESSMENT
Abdominal pain  Fever  Abnormal CT     CT A/P w/ PO & IVC: Findings consistent with terminal ileitis. Laboratory correlation for underlying inflammatory (i.e. Crohn's disease) versus infectious etiologies recommended.    Plan:  - CT A/P noted & discussed with patient  - GI PCR indeterminate for Norovirus; repeat GI PCR sent today   - If GI PCR negative, may need colonoscopy   - Fecal calprotectin sent, f/u results   - ASCA/ANCA pending  - ESR/CRP elevated   - Advance diet as tolerated  - Pain management   - Monitor stools   - Antibiotics as per ID, on Zosyn  - BCx (6/7) NGTD   - Will follow

## 2025-06-09 NOTE — PROGRESS NOTE ADULT - SUBJECTIVE AND OBJECTIVE BOX
[facility]  INFECTIOUS DISEASES - Shelly Walker, [address], [place] [address]  Tel: [phone]     Fax: [phone]  =======================================================    SHIRAZPIEDAD [id number]    Follow up: Tmax of 102.5. Reports feeling better. Had 1 BM earlier, per nursing assistant was soft stool. R sided abdominal pain improved.    Allergies:  No Known Allergies      Antibiotics:  acetaminophen     Tablet .. 650 milliGRAM(s) Oral every 6 hours PRN  piperacillin/tazobactam IVPB.. 3.375 Gram(s) IV Intermittent every 8 hours       REVIEW OF SYSTEMS:  CONSTITUTIONAL:  + fever  CARDIOVASCULAR:  No chest pain or SOB  RESPIRATORY:  No cough, shortness of breath  GASTROINTESTINAL:  No nausea or vomiting.  GENITOURINARY:  No dysuria, frequency or urgency  MUSCULOSKELETAL:  no joint aches, no muscle pain  SKIN:  No rash  NEUROLOGIC:  No headache or dizziness  PSYCHIATRIC:  No disorder of thought or mood.     Physical Exam:  ICU Vital Signs Last 24 Hrs  T(C): 36.6 (09 Jun 2025 12:17), Max: 39.2 (08 Jun 2025 20:55)  T(F): 97.9 (09 Jun 2025 12:17), Max: 102.5 (08 Jun 2025 20:55)  HR: 77 (09 Jun 2025 12:17) (65 - 90)  BP: 114/72 (09 Jun 2025 12:17) (100/62 - 114/72)  BP(mean): --  ABP: --  ABP(mean): --  RR: 18 (09 Jun 2025 12:17) (18 - 18)  SpO2: 98% (09 Jun 2025 12:17) (96% - 98%)    O2 Parameters below as of 09 Jun 2025 12:17  Patient On (Oxygen Delivery Method): room air           GEN: NAD  HEENT: normocephalic and atraumatic.  NECK: Supple.  No lymphadenopathy   LUNGS: Clear to auscultation.  HEART: Regular rate and rhythm   ABDOMEN: Soft, nontender, and nondistended.    : No CVA tenderness  EXTREMITIES: No leg edema.  MSK: no joint swelling  NEUROLOGIC: grossly intact.  PSYCHIATRIC: Appropriate affect .  SKIN: No ulceration or induration present.      Labs:  06-09    139  |  106  |  8   ----------------------------<  84  4.2   |  26  |  1.10    Ca    9.0      09 Jun 2025 11:15    TPro  7.4  /  Alb  3.0[L]  /  TBili  0.5  /  DBili  x   /  AST  25  /  ALT  38  /  AlkPhos  71  06-09                          14.2   7.07  )-----------( 176      ( 09 Jun 2025 11:15 )             40.1     PT/INR - ( 07 Jun 2025 19:00 )   PT: 14.6 sec;   INR: 1.24 ratio         PTT - ( 07 Jun 2025 19:00 )  PTT:30.1 sec  Urinalysis Basic - ( 09 Jun 2025 11:15 )    Color: x / Appearance: x / SG: x / pH: x  Gluc: 84 mg/dL / Ketone: x  / Bili: x / Urobili: x   Blood: x / Protein: x / Nitrite: x   Leuk Esterase: x / RBC: x / WBC x   Sq Epi: x / Non Sq Epi: x / Bacteria: x      LIVER FUNCTIONS - ( 09 Jun 2025 11:15 )  Alb: 3.0 g/dL / Pro: 7.4 g/dL / ALK PHOS: 71 U/L / ALT: 38 U/L / AST: 25 U/L / GGT: x             RECENT CULTURES:  06-07 @ 20:27 Clean Catch Clean Catch (Midstream)     No growth        06-07 @ 19:00 Blood Blood-Peripheral     No growth at 24 hours        06-07 @ 18:50 Blood Blood-Peripheral     No growth at 24 hours              All imaging and data are reviewed.     < from: CT Abdomen and Pelvis w/ Oral Cont and w/ IV Cont (06.07.25 @ 21:26) >  FINDINGS:  LOWER CHEST: Within normal limits.    LIVER: Within normal limits.  BILE DUCTS: Normal caliber.  GALLBLADDER: Within normal limits.  SPLEEN: Within normal limits.  PANCREAS: Within normal limits.  ADRENALS: Within normal limits.  KIDNEYS/URETERS: No renal stones or hydronephrosis. Symmetric enhancement   bilaterally. Subcentimeter simple renal cyst on the right.    BLADDER: Within normal limits.  REPRODUCTIVE ORGANS: Prostate within normal limits.    BOWEL: Prominent circumferential wall thickening and edema within the   terminal ileum. No bowel obstruction. Appendix suspected to be within   normal limits and located posterior to the adjacent inflamed terminal   ileum (series 602, images 77-79).  PERITONEUM/RETROPERITONEUM: Within normal limits.  VESSELS: Within normal limits.  LYMPH NODES: Multiple mildly prominent mesenteric lymph nodes in the RLQ,   likely reactive.  ABDOMINAL WALL: Within normal limits.  BONES: Within normal limits.    IMPRESSION:  Findings consistent with terminal ileitis. Laboratory correlation for   underlying inflammatory (i.e. Crohn's disease) versus infectious   etiologies recommended.    --- End of Report ---          < end of copied text >   [facility]  INFECTIOUS DISEASES - Shelly Walker, [address], [place] [address]  Tel: [phone]     Fax: [phone]  =======================================================    SHIRAZPIEDAD [id number]    Follow up: Tmax of 102.5 last night. Reports feeling better. Had 1 BM earlier, per nursing assistant was soft stool. R sided abdominal pain improved.    Allergies:  No Known Allergies      Antibiotics:  acetaminophen     Tablet .. 650 milliGRAM(s) Oral every 6 hours PRN  piperacillin/tazobactam IVPB.. 3.375 Gram(s) IV Intermittent every 8 hours       REVIEW OF SYSTEMS:  CONSTITUTIONAL:  + fever  CARDIOVASCULAR:  No chest pain or SOB  RESPIRATORY:  No cough, shortness of breath  GASTROINTESTINAL:  No nausea or vomiting.  GENITOURINARY:  No dysuria, frequency or urgency  MUSCULOSKELETAL:  no joint aches, no muscle pain  SKIN:  No rash  NEUROLOGIC:  + headache--improved  PSYCHIATRIC:  No disorder of thought or mood.     Physical Exam:  ICU Vital Signs Last 24 Hrs  T(C): 36.6 (09 Jun 2025 12:17), Max: 39.2 (08 Jun 2025 20:55)  T(F): 97.9 (09 Jun 2025 12:17), Max: 102.5 (08 Jun 2025 20:55)  HR: 77 (09 Jun 2025 12:17) (65 - 90)  BP: 114/72 (09 Jun 2025 12:17) (100/62 - 114/72)  BP(mean): --  ABP: --  ABP(mean): --  RR: 18 (09 Jun 2025 12:17) (18 - 18)  SpO2: 98% (09 Jun 2025 12:17) (96% - 98%)    O2 Parameters below as of 09 Jun 2025 12:17  Patient On (Oxygen Delivery Method): room air           GEN: NAD  HEENT: normocephalic and atraumatic.  NECK: Supple.    LUNGS: Normal respiratory effort  HEART: Regular rate and rhythm   ABDOMEN: Soft, nontender, and nondistended.    EXTREMITIES: No leg edema.  NEUROLOGIC: grossly intact.  PSYCHIATRIC: Appropriate affect . no photophobia, no nuchal rigidity      Labs:  06-09    139  |  106  |  8   ----------------------------<  84  4.2   |  26  |  1.10    Ca    9.0      09 Jun 2025 11:15    TPro  7.4  /  Alb  3.0[L]  /  TBili  0.5  /  DBili  x   /  AST  25  /  ALT  38  /  AlkPhos  71  06-09                          14.2   7.07  )-----------( 176      ( 09 Jun 2025 11:15 )             40.1     PT/INR - ( 07 Jun 2025 19:00 )   PT: 14.6 sec;   INR: 1.24 ratio         PTT - ( 07 Jun 2025 19:00 )  PTT:30.1 sec  Urinalysis Basic - ( 09 Jun 2025 11:15 )    Color: x / Appearance: x / SG: x / pH: x  Gluc: 84 mg/dL / Ketone: x  / Bili: x / Urobili: x   Blood: x / Protein: x / Nitrite: x   Leuk Esterase: x / RBC: x / WBC x   Sq Epi: x / Non Sq Epi: x / Bacteria: x      LIVER FUNCTIONS - ( 09 Jun 2025 11:15 )  Alb: 3.0 g/dL / Pro: 7.4 g/dL / ALK PHOS: 71 U/L / ALT: 38 U/L / AST: 25 U/L / GGT: x             RECENT CULTURES:  06-07 @ 20:27 Clean Catch Clean Catch (Midstream)     No growth        06-07 @ 19:00 Blood Blood-Peripheral     No growth at 24 hours        06-07 @ 18:50 Blood Blood-Peripheral     No growth at 24 hours              All imaging and data are reviewed.     < from: CT Abdomen and Pelvis w/ Oral Cont and w/ IV Cont (06.07.25 @ 21:26) >  FINDINGS:  LOWER CHEST: Within normal limits.    LIVER: Within normal limits.  BILE DUCTS: Normal caliber.  GALLBLADDER: Within normal limits.  SPLEEN: Within normal limits.  PANCREAS: Within normal limits.  ADRENALS: Within normal limits.  KIDNEYS/URETERS: No renal stones or hydronephrosis. Symmetric enhancement   bilaterally. Subcentimeter simple renal cyst on the right.    BLADDER: Within normal limits.  REPRODUCTIVE ORGANS: Prostate within normal limits.    BOWEL: Prominent circumferential wall thickening and edema within the   terminal ileum. No bowel obstruction. Appendix suspected to be within   normal limits and located posterior to the adjacent inflamed terminal   ileum (series 602, images 77-79).  PERITONEUM/RETROPERITONEUM: Within normal limits.  VESSELS: Within normal limits.  LYMPH NODES: Multiple mildly prominent mesenteric lymph nodes in the RLQ,   likely reactive.  ABDOMINAL WALL: Within normal limits.  BONES: Within normal limits.    IMPRESSION:  Findings consistent with terminal ileitis. Laboratory correlation for   underlying inflammatory (i.e. Crohn's disease) versus infectious   etiologies recommended.    --- End of Report ---          < end of copied text >   [facility]  INFECTIOUS DISEASES - Shelly Walker, [address], [place] [address]  Tel: [phone]     Fax: [phone]  =======================================================    SHIRAZPIEDAD [id number]    Follow up: Tmax of 102.5 last night. Reports feeling better. Had 1 BM earlier, per nursing assistant was soft stool but formed, not liquid. R sided abdominal pain improved.    Allergies:  No Known Allergies      Antibiotics:  acetaminophen     Tablet .. 650 milliGRAM(s) Oral every 6 hours PRN  piperacillin/tazobactam IVPB.. 3.375 Gram(s) IV Intermittent every 8 hours       REVIEW OF SYSTEMS:  CONSTITUTIONAL:  + fever  CARDIOVASCULAR:  No chest pain or SOB  RESPIRATORY:  No cough, shortness of breath  GASTROINTESTINAL:  No nausea or vomiting.  GENITOURINARY:  No dysuria, frequency or urgency  MUSCULOSKELETAL:  no joint aches, no muscle pain  SKIN:  No rash  NEUROLOGIC:  + headache--improved  PSYCHIATRIC:  No disorder of thought or mood.     Physical Exam:  ICU Vital Signs Last 24 Hrs  T(C): 36.6 (09 Jun 2025 12:17), Max: 39.2 (08 Jun 2025 20:55)  T(F): 97.9 (09 Jun 2025 12:17), Max: 102.5 (08 Jun 2025 20:55)  HR: 77 (09 Jun 2025 12:17) (65 - 90)  BP: 114/72 (09 Jun 2025 12:17) (100/62 - 114/72)  BP(mean): --  ABP: --  ABP(mean): --  RR: 18 (09 Jun 2025 12:17) (18 - 18)  SpO2: 98% (09 Jun 2025 12:17) (96% - 98%)    O2 Parameters below as of 09 Jun 2025 12:17  Patient On (Oxygen Delivery Method): room air           GEN: NAD  HEENT: normocephalic and atraumatic.  NECK: Supple.    LUNGS: Normal respiratory effort  HEART: Regular rate and rhythm   ABDOMEN: Soft, nontender, and nondistended.    EXTREMITIES: No leg edema.  NEUROLOGIC: grossly intact. no photophobia, no nuchal rigidity  PSYCHIATRIC: Appropriate affect .       Labs:  06-09    139  |  106  |  8   ----------------------------<  84  4.2   |  26  |  1.10    Ca    9.0      09 Jun 2025 11:15    TPro  7.4  /  Alb  3.0[L]  /  TBili  0.5  /  DBili  x   /  AST  25  /  ALT  38  /  AlkPhos  71  06-09                          14.2   7.07  )-----------( 176      ( 09 Jun 2025 11:15 )             40.1     PT/INR - ( 07 Jun 2025 19:00 )   PT: 14.6 sec;   INR: 1.24 ratio         PTT - ( 07 Jun 2025 19:00 )  PTT:30.1 sec  Urinalysis Basic - ( 09 Jun 2025 11:15 )    Color: x / Appearance: x / SG: x / pH: x  Gluc: 84 mg/dL / Ketone: x  / Bili: x / Urobili: x   Blood: x / Protein: x / Nitrite: x   Leuk Esterase: x / RBC: x / WBC x   Sq Epi: x / Non Sq Epi: x / Bacteria: x      LIVER FUNCTIONS - ( 09 Jun 2025 11:15 )  Alb: 3.0 g/dL / Pro: 7.4 g/dL / ALK PHOS: 71 U/L / ALT: 38 U/L / AST: 25 U/L / GGT: x             RECENT CULTURES:  06-07 @ 20:27 Clean Catch Clean Catch (Midstream)     No growth        06-07 @ 19:00 Blood Blood-Peripheral     No growth at 24 hours        06-07 @ 18:50 Blood Blood-Peripheral     No growth at 24 hours              All imaging and data are reviewed.     < from: CT Abdomen and Pelvis w/ Oral Cont and w/ IV Cont (06.07.25 @ 21:26) >  FINDINGS:  LOWER CHEST: Within normal limits.    LIVER: Within normal limits.  BILE DUCTS: Normal caliber.  GALLBLADDER: Within normal limits.  SPLEEN: Within normal limits.  PANCREAS: Within normal limits.  ADRENALS: Within normal limits.  KIDNEYS/URETERS: No renal stones or hydronephrosis. Symmetric enhancement   bilaterally. Subcentimeter simple renal cyst on the right.    BLADDER: Within normal limits.  REPRODUCTIVE ORGANS: Prostate within normal limits.    BOWEL: Prominent circumferential wall thickening and edema within the   terminal ileum. No bowel obstruction. Appendix suspected to be within   normal limits and located posterior to the adjacent inflamed terminal   ileum (series 602, images 77-79).  PERITONEUM/RETROPERITONEUM: Within normal limits.  VESSELS: Within normal limits.  LYMPH NODES: Multiple mildly prominent mesenteric lymph nodes in the RLQ,   likely reactive.  ABDOMINAL WALL: Within normal limits.  BONES: Within normal limits.    IMPRESSION:  Findings consistent with terminal ileitis. Laboratory correlation for   underlying inflammatory (i.e. Crohn's disease) versus infectious   etiologies recommended.    --- End of Report ---          < end of copied text >

## 2025-06-09 NOTE — PROGRESS NOTE ADULT - ASSESSMENT
25 y M w/ no PMH presents to ED due to fever, chills, body aches. Admitted for GI evaluation for CT imaging findings consistent with terminal ileitis concerning for infectious eitology vs Crohn's dx.    #Fever  #abd pain  - likely 2/2 intraabdominal infection, no family history of IBD  - CT imaging concerning for terminal ileitis  - UA ngeative  - continue zosyn for now  - BCx NGTD  - follow up Chlamydia/ G  - follow up repeat GI PCR- 1st one indeterminate for norovirus  - follow up fecal calprotectin  - discussed with ID, Dr. Galvan  - discussed with Verenice LANGE NP    #LAUREN  - likely pre-renal secondary to dec PO intake given he has been sick for last few days  - dc IVF    #Hyponatremia  - likely hypovolemic hyponatremia secondary to dec PO intake from illness  - dc IVF  - improved    #DVT prophylaxis  encourage ambulation    patient to update family on his own

## 2025-06-09 NOTE — CASE MANAGEMENT PROGRESS NOTE - NSCMPROGRESSNOTE_GEN_ALL_CORE
Discussed pt on rounds, pt remains acute, awaiting further testing, pt febrile, on IV Zosyn pending diet to be advanced. CM will continue to collaborate with interdisciplinary team and remain available to assist.

## 2025-06-09 NOTE — PROGRESS NOTE ADULT - SUBJECTIVE AND OBJECTIVE BOX
Patient is a 25y old  Male who presents with a chief complaint of fevers/chills/ body ache/ diffuse gas like pain in abdomen (09 Jun 2025 13:41)      Patient seen and examined at bedside. No events overnight but with fever overnight. States he feels better, abd pain improved as well. Denies chest pain, sob, fever at this time, nausea or vomiting, diarrhea, had BM this morning. Diarrhea x 5 episodes yesterday but little amount    ALLERGIES:  No Known Allergies    MEDICATIONS  (STANDING):  piperacillin/tazobactam IVPB.. 3.375 Gram(s) IV Intermittent every 8 hours    MEDICATIONS  (PRN):  acetaminophen     Tablet .. 650 milliGRAM(s) Oral every 6 hours PRN Temp greater or equal to 38C (100.4F), Mild Pain (1 - 3)    Vital Signs Last 24 Hrs  T(F): 97.9 (09 Jun 2025 12:17), Max: 102.5 (08 Jun 2025 20:55)  HR: 77 (09 Jun 2025 12:17) (65 - 90)  BP: 114/72 (09 Jun 2025 12:17) (100/62 - 114/72)  RR: 18 (09 Jun 2025 12:17) (18 - 18)  SpO2: 98% (09 Jun 2025 12:17) (96% - 98%)  I&O's Summary      PHYSICAL EXAM:  GENERAL: NAD, well-groomed, well-developed  HEAD:  Atraumatic, Normocephalic  EYES: PEERL, conjunctiva and sclera clear  ENMT: Moist mucous membranes, Supple, No JVD  CHEST/LUNG: Clear to auscultation bilaterally, good air entry, non-labored breathing  HEART: RRR; S1/S2, No murmur  ABDOMEN: Soft, Nontender, Nondistended; Bowel sounds present  EXTREMITIES: No calf tenderness, No cyanosis, No edema  SKIN: Warm, perfused  PSYCH: Normal mood, Normal affect  NERVOUS SYSTEM:  A/O x3, Good concentration    LABS:                        14.2   7.07  )-----------( 176      ( 09 Jun 2025 11:15 )             40.1     06-09    139  |  106  |  8   ----------------------------<  84  4.2   |  26  |  1.10    Ca    9.0      09 Jun 2025 11:15    TPro  7.4  /  Alb  3.0  /  TBili  0.5  /  DBili  x   /  AST  25  /  ALT  38  /  AlkPhos  71  06-09      PT/INR - ( 07 Jun 2025 19:00 )   PT: 14.6 sec;   INR: 1.24 ratio         PTT - ( 07 Jun 2025 19:00 )  PTT:30.1 sec  Lactate, Blood: 1.1 mmol/L (06-07 @ 19:00)                      POCT Blood Glucose.: 100 mg/dL (09 Jun 2025 07:54)      Urinalysis Basic - ( 09 Jun 2025 11:15 )    Color: x / Appearance: x / SG: x / pH: x  Gluc: 84 mg/dL / Ketone: x  / Bili: x / Urobili: x   Blood: x / Protein: x / Nitrite: x   Leuk Esterase: x / RBC: x / WBC x   Sq Epi: x / Non Sq Epi: x / Bacteria: x        Culture - Urine (collected 07 Jun 2025 20:27)  Source: Clean Catch Clean Catch (Midstream)  Final Report (08 Jun 2025 22:55):    No growth    Culture - Blood (collected 07 Jun 2025 19:00)  Source: Blood Blood-Peripheral  Preliminary Report (09 Jun 2025 03:01):    No growth at 24 hours    Culture - Blood (collected 07 Jun 2025 18:50)  Source: Blood Blood-Peripheral  Preliminary Report (09 Jun 2025 03:01):    No growth at 24 hours          RADIOLOGY & ADDITIONAL TESTS:    Care Discussed with Consultants/Other Providers:

## 2025-06-09 NOTE — CARE COORDINATION ASSESSMENT. - PRO ARRIVE FROM
CM spoke w pt. Per pt lives with father and brother in a house with 4 stairs to enter home, 10 steps inside. Pt was independent w ADL, ambulation, driving and med management prior to admission. Pt drives to appointments, denies community services, denies DME or need for usage. CM verified: PCP: Dr. Gonzalez Pharmacy: [facility]. : stated no. Pt stated his father will  when medically safe for discharge./home

## 2025-06-09 NOTE — PROGRESS NOTE ADULT - SUBJECTIVE AND OBJECTIVE BOX
[facility]    Jens Manning NP    [address] [place] [address]  [phone]      Chief Complaint:  Patient is a 25y old  Male who presents with a chief complaint of fevers/chills/ body ache/ diffuse gas like pain in abdomen (08 Jun 2025 13:54)      HPI/ 24 hr events:   Patient seen and examined at bedside  Reports having intermittent RLQ/LLQ abdominal pain which "feels like gas"  Has been passing flatus and with 6 liquid/brown stools overnight   GI PCR indeterminate for norovirus, repeat GI PCR sent   No recent travel or sick contacts  No recent consumption of raw/undercooked foods or shellfish   Had fevers overnight, Tmax 102.5F  Tolerating clear liquids   No nausea or vomiting       REVIEW OF SYSTEMS:   General: Negative  HEENT: Negative  CV: Negative  Respiratory: Negative  GI: See HPI  : Negative  MSK: Negative  Hematologic: Negative  Skin: Negative    MEDICATIONS:   MEDICATIONS  (STANDING):  piperacillin/tazobactam IVPB.. 3.375 Gram(s) IV Intermittent every 8 hours    MEDICATIONS  (PRN):  acetaminophen     Tablet .. 650 milliGRAM(s) Oral every 6 hours PRN Temp greater or equal to 38C (100.4F), Mild Pain (1 - 3)      ALLERGIES:   Allergies    No Known Allergies    Intolerances        VITAL SIGNS:   Vital Signs Last 24 Hrs  T(C): 36.6 (09 Jun 2025 05:23), Max: 39.2 (08 Jun 2025 20:55)  T(F): 97.9 (09 Jun 2025 05:23), Max: 102.5 (08 Jun 2025 20:55)  HR: 65 (09 Jun 2025 05:23) (65 - 90)  BP: 100/62 (09 Jun 2025 05:23) (100/62 - 120/71)  BP(mean): --  RR: 18 (09 Jun 2025 05:23) (18 - 18)  SpO2: 96% (09 Jun 2025 05:23) (96% - 97%)    Parameters below as of 09 Jun 2025 05:23  Patient On (Oxygen Delivery Method): room air      I&O's Summary      PHYSICAL EXAM:   GENERAL:  No acute distress  HEENT:  NC/AT  CHEST:  No increased effort  HEART:  Regular rate  ABDOMEN:  Soft, +TTP RLQ, non-distended  EXTREMITIES: No cyanosis  SKIN:  Warm, dry  NEURO:  Calm, cooperative    LABS:                        13.9   8.44  )-----------( 169      ( 08 Jun 2025 05:06 )             38.3     06-08    136  |  103  |  13  ----------------------------<  102[H]  3.4[L]   |  26  |  1.30    Ca    8.3[L]      08 Jun 2025 05:06    TPro  7.2  /  Alb  3.0[L]  /  TBili  0.7  /  DBili  x   /  AST  18  /  ALT  31  /  AlkPhos  78  06-08    LIVER FUNCTIONS - ( 08 Jun 2025 05:06 )  Alb: 3.0 g/dL / Pro: 7.2 g/dL / ALK PHOS: 78 U/L / ALT: 31 U/L / AST: 18 U/L / GGT: x           PT/INR - ( 07 Jun 2025 19:00 )   PT: 14.6 sec;   INR: 1.24 ratio         PTT - ( 07 Jun 2025 19:00 )  PTT:30.1 sec    Culture - Urine (collected 07 Jun 2025 20:27)  Source: Clean Catch Clean Catch (Midstream)  Final Report (08 Jun 2025 22:55):    No growth    Culture - Blood (collected 07 Jun 2025 19:00)  Source: Blood Blood-Peripheral  Preliminary Report (09 Jun 2025 03:01):    No growth at 24 hours    Culture - Blood (collected 07 Jun 2025 18:50)  Source: Blood Blood-Peripheral  Preliminary Report (09 Jun 2025 03:01):    No growth at 24 hours                      GI PCR Panel: Indeterminate GI Panel PCR evaluates for:  Campylobacter, Plesiomonas shigelloides, Salmonella, Vibrio, Yersinia  enterocolitica, Enteroaggregative Escherichia (EAEC), Enteropathogenic E.  coli (EPEC), Enterotoxigenic E. coli (ETEC), Shiga-like toxinproducing  E. coli (STEC), E. coli O157, Shigella/Enteroinvasive E. coli (EIEC),  Adenovirus, Astrovirus, Norovirus, Rotavirus, Sapovirus, Cryptosporidium,  Cyclospora cayetanensis, Entamoeba histolytica, Giardia lamblia.  For culture and susceptibility reports refer to "reflex stool culture". (06-08-25 @ 13:30)                RADIOLOGY & ADDITIONAL STUDIES:

## 2025-06-09 NOTE — CHART NOTE - NSCHARTNOTEFT_GEN_A_CORE
Called by RN, concern about GI PCR indeterminate.  - Repeat GI PCR now   - Rest of management per Primary Team  - RN to notify with any changes. Called by RN, concern about GI PCR indeterminate.  - Repeat GI PCR now   - Isolation precautions per nursing protocol - Contact for Norovirus  - Rest of management per Primary Team  - RN to notify with any changes.

## 2025-06-10 LAB
ANION GAP SERPL CALC-SCNC: 5 MMOL/L — SIGNIFICANT CHANGE UP (ref 5–17)
B BURGDOR C6 AB SER-ACNC: NEGATIVE — SIGNIFICANT CHANGE UP
B BURGDOR IGG+IGM SER-ACNC: 0.13 INDEX — SIGNIFICANT CHANGE UP (ref 0.01–0.9)
B MICROTI DNA BLD QL NAA+PROBE: SIGNIFICANT CHANGE UP
BABESIA 18S RRNA BLD QL NAA+PROBE: SIGNIFICANT CHANGE UP
BAKER'S YEAST IGA QN IA: 20.5 UNITS — HIGH
BAKER'S YEAST IGA QN IA: ABNORMAL
BAKER'S YEAST IGG QN IA: 14.8 UNITS — SIGNIFICANT CHANGE UP
BAKER'S YEAST IGG QN IA: NEGATIVE — SIGNIFICANT CHANGE UP
BUN SERPL-MCNC: 12 MG/DL — SIGNIFICANT CHANGE UP (ref 7–23)
CALCIUM SERPL-MCNC: 9.4 MG/DL — SIGNIFICANT CHANGE UP (ref 8.5–10.1)
CHLORIDE SERPL-SCNC: 106 MMOL/L — SIGNIFICANT CHANGE UP (ref 96–108)
CO2 SERPL-SCNC: 27 MMOL/L — SIGNIFICANT CHANGE UP (ref 22–31)
CREAT SERPL-MCNC: 1.2 MG/DL — SIGNIFICANT CHANGE UP (ref 0.5–1.3)
EGFR: 87 ML/MIN/1.73M2 — SIGNIFICANT CHANGE UP
EGFR: 87 ML/MIN/1.73M2 — SIGNIFICANT CHANGE UP
GLUCOSE SERPL-MCNC: 89 MG/DL — SIGNIFICANT CHANGE UP (ref 70–99)
HCT VFR BLD CALC: 42.8 % — SIGNIFICANT CHANGE UP (ref 39–50)
HGB BLD-MCNC: 14.8 G/DL — SIGNIFICANT CHANGE UP (ref 13–17)
HIV 1+2 AB+HIV1 P24 AG SERPL QL IA: SIGNIFICANT CHANGE UP
MCHC RBC-ENTMCNC: 29.3 PG — SIGNIFICANT CHANGE UP (ref 27–34)
MCHC RBC-ENTMCNC: 34.6 G/DL — SIGNIFICANT CHANGE UP (ref 32–36)
MCV RBC AUTO: 84.8 FL — SIGNIFICANT CHANGE UP (ref 80–100)
NRBC BLD AUTO-RTO: 0 /100 WBCS — SIGNIFICANT CHANGE UP (ref 0–0)
PLATELET # BLD AUTO: 229 K/UL — SIGNIFICANT CHANGE UP (ref 150–400)
POTASSIUM SERPL-MCNC: 4.2 MMOL/L — SIGNIFICANT CHANGE UP (ref 3.5–5.3)
POTASSIUM SERPL-SCNC: 4.2 MMOL/L — SIGNIFICANT CHANGE UP (ref 3.5–5.3)
RBC # BLD: 5.05 M/UL — SIGNIFICANT CHANGE UP (ref 4.2–5.8)
RBC # FLD: 12.2 % — SIGNIFICANT CHANGE UP (ref 10.3–14.5)
SODIUM SERPL-SCNC: 138 MMOL/L — SIGNIFICANT CHANGE UP (ref 135–145)
WBC # BLD: 6.34 K/UL — SIGNIFICANT CHANGE UP (ref 3.8–10.5)
WBC # FLD AUTO: 6.34 K/UL — SIGNIFICANT CHANGE UP (ref 3.8–10.5)

## 2025-06-10 PROCEDURE — 99232 SBSQ HOSP IP/OBS MODERATE 35: CPT

## 2025-06-10 PROCEDURE — 99233 SBSQ HOSP IP/OBS HIGH 50: CPT

## 2025-06-10 RX ADMIN — Medication 25 GRAM(S): at 05:27

## 2025-06-10 RX ADMIN — Medication 25 GRAM(S): at 14:46

## 2025-06-10 NOTE — PROGRESS NOTE ADULT - SUBJECTIVE AND OBJECTIVE BOX
Patient is a 25y old  Male who presents with a chief complaint of fevers/chills/ body ache/ diffuse gas like pain in abdomen (10 Jourdan 2025 13:26)      Patient seen and examined at bedside. No events overnight, afebrile. States he feels better, abd pain improved as well. Denies chest pain, sob, fever at this time, nausea or vomiting, diarrhea. No diarrhea so far today    ALLERGIES:  No Known Allergies    MEDICATIONS  (STANDING):  piperacillin/tazobactam IVPB.. 3.375 Gram(s) IV Intermittent every 8 hours    MEDICATIONS  (PRN):  acetaminophen     Tablet .. 650 milliGRAM(s) Oral every 6 hours PRN Temp greater or equal to 38C (100.4F), Mild Pain (1 - 3)    Vital Signs Last 24 Hrs  T(F): 98 (10 Jourdan 2025 12:21), Max: 98.7 (09 Jun 2025 20:38)  HR: 64 (10 Jourdan 2025 12:21) (64 - 72)  BP: 120/72 (10 Jourdan 2025 12:21) (107/66 - 120/72)  RR: 19 (10 Jourdan 2025 12:21) (18 - 19)  SpO2: 99% (10 Jourdan 2025 12:21) (97% - 99%)  I&O's Summary    09 Jun 2025 07:01  -  10 Jourdan 2025 07:00  --------------------------------------------------------  IN: 550 mL / OUT: 0 mL / NET: 550 mL    10 Jourdan 2025 07:01  -  10 Jourdan 2025 15:29  --------------------------------------------------------  IN: 125 mL / OUT: 0 mL / NET: 125 mL        PHYSICAL EXAM:  GENERAL: NAD, well-groomed, well-developed  HEAD:  Atraumatic, Normocephalic  EYES: PEERL, conjunctiva and sclera clear  ENMT: Moist mucous membranes, Supple, No JVD  CHEST/LUNG: Clear to auscultation bilaterally, good air entry, non-labored breathing  HEART: RRR; S1/S2, No murmur  ABDOMEN: Soft, Nontender, Nondistended; Bowel sounds present  EXTREMITIES: No calf tenderness, No cyanosis, No edema  SKIN: Warm, perfused  PSYCH: Normal mood, Normal affect  NERVOUS SYSTEM:  A/O x3, Good concentration  LABS:                        14.8   6.34  )-----------( 229      ( 10 Jourdan 2025 07:30 )             42.8     06-10    138  |  106  |  12  ----------------------------<  89  4.2   |  27  |  1.20    Ca    9.4      10 Jourdan 2025 07:30    TPro  7.4  /  Alb  3.0  /  TBili  0.5  /  DBili  x   /  AST  25  /  ALT  38  /  AlkPhos  71  06-09      PT/INR - ( 07 Jun 2025 19:00 )   PT: 14.6 sec;   INR: 1.24 ratio         PTT - ( 07 Jun 2025 19:00 )  PTT:30.1 sec  Lactate, Blood: 1.1 mmol/L (06-07 @ 19:00)                          Urinalysis Basic - ( 10 Jourdan 2025 07:30 )    Color: x / Appearance: x / SG: x / pH: x  Gluc: 89 mg/dL / Ketone: x  / Bili: x / Urobili: x   Blood: x / Protein: x / Nitrite: x   Leuk Esterase: x / RBC: x / WBC x   Sq Epi: x / Non Sq Epi: x / Bacteria: x        Culture - Urine (collected 07 Jun 2025 20:27)  Source: Clean Catch Clean Catch (Midstream)  Final Report (08 Jun 2025 22:55):    No growth    Culture - Blood (collected 07 Jun 2025 19:00)  Source: Blood Blood-Peripheral  Preliminary Report (10 Jourdan 2025 03:01):    No growth at 48 Hours    Culture - Blood (collected 07 Jun 2025 18:50)  Source: Blood Blood-Peripheral  Preliminary Report (10 Jourdan 2025 03:01):    No growth at 48 Hours          RADIOLOGY & ADDITIONAL TESTS:    Care Discussed with Consultants/Other Providers:

## 2025-06-10 NOTE — PROGRESS NOTE ADULT - ASSESSMENT
Abdominal pain  Fever  Abnormal CT     CT A/P w/ PO & IVC: Findings consistent with terminal ileitis. Laboratory correlation for underlying inflammatory (i.e. Crohn's disease) versus infectious etiologies recommended.    Plan:  - CT A/P noted & discussed with patient  - GI PCR indeterminate for Norovirus; repeat GI PCR negative   - Planned for colonoscopy on Thursday, 6/12  - Regular diet today; start clear liquids tomorrow AM  - Will order bowel prep   - Fecal calprotectin and ASCA/ANCA sent, f/u results   - ESR/CRP elevated   - Pain management   - Monitor stools   - Antibiotics as per ID, on Zosyn  - BCx (6/7) prelim NGTD   - Will follow

## 2025-06-10 NOTE — PROGRESS NOTE ADULT - ASSESSMENT
5-year-old male with no prior medical history.  Patient presenting with fever abdominal pain.  Reports abdominal pain for past 5 days associated with constipation relieved with MiraLAX.  Started having fevers 3 days prior to presentation.  In the emergency room patient afebrile with no leukocytosis.  CT AP reporting terminal ileitis.  Patient started on Zosyn[place] ? [place]. No recent travel. Played golf recently but no other outdoor activities.    Unclear if fevers and terminal ileitis is infectious in etiology. GI PCR showed indeterminate norovirus, although repeat GI PCR is negative. Fever resolved and feels better. . Had fever last night but feels better today. No leukocytosis. Blood cultures remain no growth. Urine GC/chlamydia negative. Lyme and babesia negative. HIV negative.    #Terminal ileitis   #Fevers    -discontinue Zosyn. observe off antibiotics  -tick serologies pending  -follow cultures to completion  -plan for colonoscopy on 6/12  -discussed with Dr. Timmons and Dr. Sheikh Kady Galvan MD  Division of infectious Diseases  Cell [phone] between 8am and 6pm  After 6pm and over the weekends please call ID service line at [phone].     35 minutes spent on total encounter assessing patient, examination, chart review, counseling and coordinating care by the attending physician/nurse/care manager.

## 2025-06-10 NOTE — PROGRESS NOTE ADULT - ASSESSMENT
25 y M w/ no PMH presents to ED due to fever, chills, body aches. Admitted for GI evaluation for CT imaging findings consistent with terminal ileitis concerning for infectious eitology vs Crohn's dx.    #Fever  #abd pain  - likely 2/2 intraabdominal infection, no family history of IBD  - CT imaging concerning for terminal ileitis  - UA ngeative  - continue zosyn for now  - BCx NGTD  - GI PCR repeat negative  - follow up fecal calprotectin  - discussed with ID, Dr. Galvan  - discussed with Verenice LANGE NP- plan for colonoscopy Thursday morning and patient agreeable, start on CLD in the AM    #LAUREN  - likely pre-renal secondary to dec PO intake given he has been sick for last few days  - dc IVF    #Hyponatremia  - likely hypovolemic hyponatremia secondary to dec PO intake from illness  - dc IVF  - improved    #DVT prophylaxis  encourage ambulation    mother updated at bedside, all questions answered

## 2025-06-10 NOTE — CHART NOTE - NSCHARTNOTEFT_GEN_A_CORE
Called by RN, patient complaining of dizziness, stating he is feeling "off."    Patient _____    Vital Signs Last 24 Hrs  T(C): 36.7 (10 Jourdan 2025 20:25), Max: 36.9 (10 Jourdan 2025 05:04)  T(F): 98 (10 Jourdan 2025 20:25), Max: 98.5 (10 Jourdan 2025 05:04)  HR: 72 (10 Jourdan 2025 20:25) (64 - 72)  BP: 126/79 (10 Jourdan 2025 20:25) (107/66 - 126/79)  BP(mean): --  RR: 18 (10 Jourdan 2025 20:25) (18 - 19)  SpO2: 96% (10 Jourdan 2025 20:25) (96% - 99%)    Parameters below as of 10 Jourdan 2025 20:25  Patient On (Oxygen Delivery Method): room air            Plan:   - Patient with new dizziness Called by RN, patient complaining of dizziness, stating he is feeling "off."    Patient evaluated at bedside. Patient endorses dizziness, describes he feels as if he is drunk, worse with head turning to the left; he states dizziness started yesterday, but he did not bring it up until now, since he thought it would resolve. At this time, patient denies any headache, chest pain, palpitations, shortness of breath, nausea, or vomiting; states abd pain has significantly improved.       Vital Signs Last 24 Hrs  T(C): 36.7 (10 Jourdan 2025 20:25), Max: 36.9 (10 Jourdan 2025 05:04)  T(F): 98 (10 Jourdan 2025 20:25), Max: 98.5 (10 Jourdan 2025 05:04)  HR: 72 (10 Jourdan 2025 20:25) (64 - 72)  BP: 126/79 (10 Jourdan 2025 20:25) (107/66 - 126/79)  BP(mean): --  RR: 18 (10 Jourdan 2025 20:25) (18 - 19)  SpO2: 96% (10 Jourdan 2025 20:25) (96% - 99%)    Parameters below as of 10 Jourdan 2025 20:25  Patient On (Oxygen Delivery Method): room air      Physical Exam:  GENERAL: appears comfortable at rest   EYES: EOMI, PERRLA  LUNGS: no increased WOB, CTA b/l, no w/r/r  HEART: S1/S2, regular rate, regular rhythm  NEUROLOGIC: AOx3, answers questions and follows commands appropriately, CN II-XII, no motor or sensory deficits, no pronator drift, negative Rhomberg       Plan:   - Patient with dizziness/vertigo  - Patient denies any headahce at this time, other acute symptoms, states overall feeling better otherwise  - VS stable   - Neuro exam nonfocal   - AM labs wnl; no leukocytosis, patient afebrile   - Urgent CT head no contrast ordered - follow up results   - RN to notify with any changes. Called by RN, patient complaining of dizziness, stating he is feeling "off."    Patient evaluated at bedside. Patient endorses dizziness, describes he feels as if he is drunk, worse with head turning to the left; he states dizziness started yesterday, but he did not bring it up until now, since he thought it would resolve. At this time, patient denies any headache, chest pain, palpitations, shortness of breath, nausea, or vomiting; states abd pain has significantly improved. Denies any recent head/neck trauma, denies neck hyperextension.       Vital Signs Last 24 Hrs  T(C): 36.7 (10 Jourdan 2025 20:25), Max: 36.9 (10 Jourdan 2025 05:04)  T(F): 98 (10 Jourdan 2025 20:25), Max: 98.5 (10 Jourdan 2025 05:04)  HR: 72 (10 Jourdan 2025 20:25) (64 - 72)  BP: 126/79 (10 Jourdan 2025 20:25) (107/66 - 126/79)  BP(mean): --  RR: 18 (10 Jourdan 2025 20:25) (18 - 19)  SpO2: 96% (10 Jourdan 2025 20:25) (96% - 99%)    Parameters below as of 10 Jourdan 2025 20:25  Patient On (Oxygen Delivery Method): room air      Physical Exam:  GENERAL: appears comfortable at rest   EYES: EOMI, PERRLA  LUNGS: no increased WOB, CTA b/l, no w/r/r  HEART: S1/S2, regular rate, regular rhythm  NEUROLOGIC: AOx3, answers questions and follows commands appropriately, CN II-XII, no motor or sensory deficits, no pronator drift, negative Rhomberg       Plan:   - Patient with dizziness/vertigo  - Patient denies any headahce at this time, other acute symptoms, states overall feeling better otherwise  - VS stable   - Neuro exam nonfocal   - AM labs wnl; no leukocytosis, patient afebrile   - Urgent CT head no contrast ordered - follow up results   - RN to notify with any changes. Called by RN, patient complaining of dizziness, stating he is feeling "off."    Patient evaluated at bedside. Patient endorses dizziness, describes he feels as if he is drunk, worse with head turning to the left; he states dizziness started yesterday, but he did not bring it up until now, since he thought it would resolve. At this time, patient denies any headache, chest pain, palpitations, shortness of breath, nausea, or vomiting; states abd pain has significantly improved. Denies any recent head/neck trauma, denies neck hyperextension.       Vital Signs Last 24 Hrs  T(C): 36.7 (10 Jourdan 2025 20:25), Max: 36.9 (10 Jourdan 2025 05:04)  T(F): 98 (10 Jourdan 2025 20:25), Max: 98.5 (10 Jourdan 2025 05:04)  HR: 72 (10 Jourdan 2025 20:25) (64 - 72)  BP: 126/79 (10 Jourdan 2025 20:25) (107/66 - 126/79)  BP(mean): --  RR: 18 (10 Jourdan 2025 20:25) (18 - 19)  SpO2: 96% (10 Jourdan 2025 20:25) (96% - 99%)    Parameters below as of 10 Jourdan 2025 20:25  Patient On (Oxygen Delivery Method): room air      Physical Exam:  GENERAL: appears comfortable at rest   EYES: EOMI, PERRLA  LUNGS: no increased WOB, CTA b/l, no w/r/r  HEART: S1/S2, regular rate, regular rhythm  NEUROLOGIC: AOx3, answers questions and follows commands appropriately, CN II-XII, no motor or sensory deficits, no pronator drift, negative Rhomberg       Plan:   - Patient with dizziness   - Patient denies any headahce at this time, other acute symptoms, states overall feeling better otherwise  - VS stable   - Neuro exam nonfocal   - AM labs wnl; no leukocytosis, patient afebrile   - Urgent CT head no contrast ordered - follow up results   - Orthostatics ordered   - RN to notify with any changes. Called by RN, patient complaining of dizziness, stating he is feeling "off."    Patient evaluated at bedside. Patient endorses dizziness, describes he feels as if he is drunk, worse with head turning to the left; he states dizziness started yesterday, but he did not bring it up until now, since he thought it would resolve. At this time, patient denies any headache, chest pain, palpitations, shortness of breath, nausea, or vomiting; states abd pain has significantly improved. Denies any recent head/neck trauma, denies neck hyperextension.       Vital Signs Last 24 Hrs  T(C): 36.7 (10 Jourdan 2025 20:25), Max: 36.9 (10 Jourdan 2025 05:04)  T(F): 98 (10 Jourdan 2025 20:25), Max: 98.5 (10 Jourdan 2025 05:04)  HR: 72 (10 Jourdan 2025 20:25) (64 - 72)  BP: 126/79 (10 Jourdan 2025 20:25) (107/66 - 126/79)  BP(mean): --  RR: 18 (10 Jourdan 2025 20:25) (18 - 19)  SpO2: 96% (10 Jourdan 2025 20:25) (96% - 99%)    Parameters below as of 10 Jourdan 2025 20:25  Patient On (Oxygen Delivery Method): room air      Physical Exam:  GENERAL: appears comfortable at rest   EYES: EOMI, PERRLA  LUNGS: no increased WOB, CTA b/l, no w/r/r  HEART: S1/S2, regular rate, regular rhythm  NEUROLOGIC: AOx3, answers questions and follows commands appropriately, CN II-XII, no motor or sensory deficits, no pronator drift, negative Rhomberg       Plan:   - Patient with dizziness   - Patient denies any headahce at this time, other acute symptoms, states overall feeling better otherwise  - VS stable   - Neuro exam nonfocal   - AM labs wnl; no leukocytosis, patient afebrile   - Urgent CT head no contrast ordered - follow up results   - Orthostatics ordered   - Rest of management per Primary Team   - RN to notify with any changes. Called by RN, patient complaining of dizziness, stating he is feeling "off."    Patient evaluated at bedside. Patient endorses dizziness, describes he feels as if he is drunk, worse with head turning to the left; he states he did not noticed the dizziness as much until yesterday, but he did not bring it up until now, since he thought it would resolve; he states he feels the dizziness originally started along with GI symptoms prior to admission. At this time, patient denies any headache, chest pain, palpitations, shortness of breath, nausea, or vomiting; states abd pain has significantly improved. Denies any recent head/neck trauma, denies neck hyperextension.       Vital Signs Last 24 Hrs  T(C): 36.7 (10 Jourdan 2025 20:25), Max: 36.9 (10 Jourdan 2025 05:04)  T(F): 98 (10 Jourdan 2025 20:25), Max: 98.5 (10 Jourdan 2025 05:04)  HR: 72 (10 Jourdan 2025 20:25) (64 - 72)  BP: 126/79 (10 Jourdan 2025 20:25) (107/66 - 126/79)  BP(mean): --  RR: 18 (10 Jourdan 2025 20:25) (18 - 19)  SpO2: 96% (10 Jourdan 2025 20:25) (96% - 99%)    Parameters below as of 10 Jourdan 2025 20:25  Patient On (Oxygen Delivery Method): room air      Physical Exam:  GENERAL: appears comfortable at rest   EYES: EOMI, PERRLA  LUNGS: no increased WOB, CTA b/l, no w/r/r  HEART: S1/S2, regular rate, regular rhythm  NEUROLOGIC: AOx3, answers questions and follows commands appropriately, CN II-XII, no motor or sensory deficits, no pronator drift, negative Rhomberg       Plan:   - Patient with dizziness   - Patient denies any headahce at this time, other acute symptoms, states overall feeling better otherwise  - VS stable   - Neuro exam nonfocal   - AM labs wnl; no leukocytosis, patient afebrile   - Urgent CT head no contrast ordered - follow up results   - Orthostatics ordered   - Rest of management per Primary Team   - RN to notify with any changes.      ADDENDUM AT 22:19:  - Called by RN, patient declining CT head, would like to refrain from imaging at this time   - Patient with MDM capacity; potential risks vs. benefits of declining CT imaging discussed with patient at length; patient expresses understanding  - Day Team to readdress in the AM  - RN to notify with any changes.

## 2025-06-10 NOTE — CASE MANAGEMENT PROGRESS NOTE - NSCMPROGRESSNOTE_GEN_ALL_CORE
Discussed pt on rounds, pt remains acute, awaiting further testing, on IV Zosyn, for colonoscopy thursday. CM will continue to collaborate with interdisciplinary team and remain available to assist.

## 2025-06-10 NOTE — PROVIDER CONTACT NOTE (CHANGE IN STATUS NOTIFICATION) - SITUATION
Pt complaining of dizziness.  Pt stated "I feel like the room is moving back and forth when I look around."

## 2025-06-10 NOTE — CHART NOTE - NSCHARTNOTEFT_GEN_A_CORE
Called by RN, patient requesting med, feeling gas pain similar to on admission.  - Patient denies any acute complaints at this time per RN  - VS stable   - Give simethicone x1 now  - Rest of management per Primary Team   - RN to notify with any changes.

## 2025-06-10 NOTE — PROGRESS NOTE ADULT - SUBJECTIVE AND OBJECTIVE BOX
[facility]  INFECTIOUS DISEASES - [address], [place] [address]  Tel: [phone]     Fax: [phone]  =======================================================    SHIRAZPIEDAD COY [id number]    Follow up: No fevers. Tolerated solid diet earlier. No bowel movement today. Denies any nausea or vomiting.    Allergies:  No Known Allergies      Antibiotics:  acetaminophen     Tablet .. 650 milliGRAM(s) Oral every 6 hours PRN       REVIEW OF SYSTEMS:  CONSTITUTIONAL:  + fever  CARDIOVASCULAR:  No chest pain or SOB  RESPIRATORY:  No cough, shortness of breath  GASTROINTESTINAL:  No nausea or vomiting.  GENITOURINARY:  No dysuria, frequency or urgency  MUSCULOSKELETAL:  no joint aches, no muscle pain  SKIN:  No rash  NEUROLOGIC:  + headache--resolved  PSYCHIATRIC:  No disorder of thought or mood.     Physical Exam:  ICU Vital Signs Last 24 Hrs  T(C): 36.7 (10 Jourdan 2025 12:21), Max: 37.1 (09 Jun 2025 20:38)  T(F): 98 (10 Jourdan 2025 12:21), Max: 98.7 (09 Jun 2025 20:38)  HR: 64 (10 Jourdan 2025 12:21) (64 - 72)  BP: 120/72 (10 Jourdan 2025 12:21) (107/66 - 120/72)  BP(mean): --  ABP: --  ABP(mean): --  RR: 19 (10 Jourdan 2025 12:21) (18 - 19)  SpO2: 99% (10 Jourdan 2025 12:21) (97% - 99%)    O2 Parameters below as of 10 Jourdan 2025 12:21  Patient On (Oxygen Delivery Method): room air        GEN: NAD  HEENT: normocephalic and atraumatic.  NECK: Supple.    LUNGS: Normal respiratory effort  HEART: Regular rate and rhythm   ABDOMEN: Soft, nontender, and nondistended.    EXTREMITIES: No leg edema.  NEUROLOGIC: grossly intact.   PSYCHIATRIC: Appropriate affect .     Labs:  06-10    138  |  106  |  12  ----------------------------<  89  4.2   |  27  |  1.20    Ca    9.4      10 Jourdan 2025 07:30    TPro  7.4  /  Alb  3.0[L]  /  TBili  0.5  /  DBili  x   /  AST  25  /  ALT  38  /  AlkPhos  71  06-09                          14.8   6.34  )-----------( 229      ( 10 Jourdan 2025 07:30 )             42.8       Urinalysis Basic - ( 10 Jourdan 2025 07:30 )    Color: x / Appearance: x / SG: x / pH: x  Gluc: 89 mg/dL / Ketone: x  / Bili: x / Urobili: x   Blood: x / Protein: x / Nitrite: x   Leuk Esterase: x / RBC: x / WBC x   Sq Epi: x / Non Sq Epi: x / Bacteria: x      LIVER FUNCTIONS - ( 09 Jun 2025 11:15 )  Alb: 3.0 g/dL / Pro: 7.4 g/dL / ALK PHOS: 71 U/L / ALT: 38 U/L / AST: 25 U/L / GGT: x             RECENT CULTURES:  06-09 @ 14:51          NotDetec  06-07 @ 20:27 Clean Catch Clean Catch (Midstream)     No growth        06-07 @ 19:00 Blood Blood-Peripheral     No growth at 48 Hours        06-07 @ 18:50 Blood Blood-Peripheral     No growth at 48 Hours              All imaging and data are reviewed.

## 2025-06-10 NOTE — PROVIDER CONTACT NOTE (CHANGE IN STATUS NOTIFICATION) - ASSESSMENT
Pt denies pain or SOB.  Neuro assessment performed, no deficits, PERRLA. Pt endorsed he ate regular diet today, denied NVD.

## 2025-06-10 NOTE — PROGRESS NOTE ADULT - SUBJECTIVE AND OBJECTIVE BOX
[facility]    Jens Manning NP    [address] [place] [address]  [phone]      Chief Complaint:  Patient is a 25y old  Male who presents with a chief complaint of fevers/chills/ body ache/ diffuse gas like pain in abdomen (09 Jun 2025 16:16)      HPI/ 24 hr events:   Patient seen and examined at bedside  Reports feeling well this morning   Lower abdominal pain is improving, rates pain 2/10 today  Tolerating regular diet  Last BM yesterday was more formed   Repeat GI PCR negative   Afebrile, VSS       REVIEW OF SYSTEMS:   General: Negative  HEENT: Negative  CV: Negative  Respiratory: Negative  GI: See HPI  : Negative  MSK: Negative  Hematologic: Negative  Skin: Negative    MEDICATIONS:   MEDICATIONS  (STANDING):  piperacillin/tazobactam IVPB.. 3.375 Gram(s) IV Intermittent every 8 hours    MEDICATIONS  (PRN):  acetaminophen     Tablet .. 650 milliGRAM(s) Oral every 6 hours PRN Temp greater or equal to 38C (100.4F), Mild Pain (1 - 3)      ALLERGIES:   Allergies    No Known Allergies    Intolerances        VITAL SIGNS:   Vital Signs Last 24 Hrs  T(C): 36.7 (10 Jourdan 2025 12:21), Max: 37.1 (09 Jun 2025 20:38)  T(F): 98 (10 Jourdan 2025 12:21), Max: 98.7 (09 Jun 2025 20:38)  HR: 64 (10 Jourdan 2025 12:21) (64 - 72)  BP: 120/72 (10 Jourdan 2025 12:21) (107/66 - 120/72)  BP(mean): --  RR: 19 (10 Jourdan 2025 12:21) (18 - 19)  SpO2: 99% (10 Jourdan 2025 12:21) (97% - 99%)    Parameters below as of 10 Jourdan 2025 12:21  Patient On (Oxygen Delivery Method): room air      I&O's Summary    09 Jun 2025 07:01  -  10 Jourdan 2025 07:00  --------------------------------------------------------  IN: 550 mL / OUT: 0 mL / NET: 550 mL    10 Jourdan 2025 07:01  -  10 Jourdan 2025 13:27  --------------------------------------------------------  IN: 125 mL / OUT: 0 mL / NET: 125 mL        PHYSICAL EXAM:   GENERAL:  No acute distress  HEENT:  NC/AT  CHEST:  No increased effort  HEART:  Regular rate  ABDOMEN:  Soft, non-tender, non-distended  EXTREMITIES: No cyanosis  SKIN:  Warm, dry  NEURO:  Calm, cooperative    LABS:                        14.8   6.34  )-----------( 229      ( 10 Jourdan 2025 07:30 )             42.8     06-10    138  |  106  |  12  ----------------------------<  89  4.2   |  27  |  1.20    Ca    9.4      10 Jourdan 2025 07:30    TPro  7.4  /  Alb  3.0[L]  /  TBili  0.5  /  DBili  x   /  AST  25  /  ALT  38  /  AlkPhos  71  06-09    LIVER FUNCTIONS - ( 09 Jun 2025 11:15 )  Alb: 3.0 g/dL / Pro: 7.4 g/dL / ALK PHOS: 71 U/L / ALT: 38 U/L / AST: 25 U/L / GGT: x               Culture - Urine (collected 07 Jun 2025 20:27)  Source: Clean Catch Clean Catch (Midstream)  Final Report (08 Jun 2025 22:55):    No growth    Culture - Blood (collected 07 Jun 2025 19:00)  Source: Blood Blood-Peripheral  Preliminary Report (10 Jourdan 2025 03:01):    No growth at 48 Hours    Culture - Blood (collected 07 Jun 2025 18:50)  Source: Blood Blood-Peripheral  Preliminary Report (10 Jourdan 2025 03:01):    No growth at 48 Hours                      GI PCR Panel: Mynor (06-09-25 @ 09:40)  GI PCR Panel: Indeterminate GI Panel PCR evaluates for:  Campylobacter, Plesiomonas shigelloides, Salmonella, Vibrio, Yersinia  enterocolitica, Enteroaggregative Escherichia (EAEC), Enteropathogenic E.  coli (EPEC), Enterotoxigenic E. coli (ETEC), Shiga-like toxinproducing  E. coli (STEC), E. coli O157, Shigella/Enteroinvasive E. coli (EIEC),  Adenovirus, Astrovirus, Norovirus, Rotavirus, Sapovirus, Cryptosporidium,  Cyclospora cayetanensis, Entamoeba histolytica, Giardia lamblia.  For culture and susceptibility reports refer to "reflex stool culture". (06-08-25 @ 13:30)                RADIOLOGY & ADDITIONAL STUDIES:

## 2025-06-11 LAB
A PHAGOCYTOPH DNA BLD QL NAA+PROBE: NEGATIVE — SIGNIFICANT CHANGE UP
AUTO DIFF PNL BLD: NEGATIVE — SIGNIFICANT CHANGE UP
B MICROTI DNA BLD QL NAA+PROBE: NEGATIVE — SIGNIFICANT CHANGE UP
B MIYAMOTOI GLPQ BLD QL NAA+NON-PROBE: NEGATIVE — SIGNIFICANT CHANGE UP
BABESIA DNA SPEC QL NAA+PROBE: NEGATIVE — SIGNIFICANT CHANGE UP
BABESIA DNA SPEC QL NAA+PROBE: NEGATIVE — SIGNIFICANT CHANGE UP
C-ANCA SER-ACNC: NEGATIVE — SIGNIFICANT CHANGE UP
CALPROTECTIN STL-MCNT: 1070 UG/G — HIGH (ref 0–120)
E CHAFFEENSIS DNA BLD QL NAA+PROBE: NEGATIVE — SIGNIFICANT CHANGE UP
E EWINGII DNA SPEC QL NAA+PROBE: NEGATIVE — SIGNIFICANT CHANGE UP
EHRLICHIA DNA SPEC QL NAA+PROBE: NEGATIVE — SIGNIFICANT CHANGE UP
P-ANCA SER-ACNC: NEGATIVE — SIGNIFICANT CHANGE UP

## 2025-06-11 PROCEDURE — 70450 CT HEAD/BRAIN W/O DYE: CPT | Mod: 26

## 2025-06-11 PROCEDURE — 99233 SBSQ HOSP IP/OBS HIGH 50: CPT

## 2025-06-11 RX ORDER — BISACODYL 5 MG
10 TABLET, DELAYED RELEASE (ENTERIC COATED) ORAL EVERY 4 HOURS
Refills: 0 | Status: COMPLETED | OUTPATIENT
Start: 2025-06-11 | End: 2025-06-11

## 2025-06-11 RX ORDER — POLYETHYLENE GLYCOL-3350 AND ELECTROLYTES 236; 6.74; 5.86; 2.97; 22.74 G/274.31G; G/274.31G; G/274.31G; G/274.31G; G/274.31G
1000 POWDER, FOR SOLUTION ORAL EVERY 4 HOURS
Refills: 0 | Status: COMPLETED | OUTPATIENT
Start: 2025-06-11 | End: 2025-06-11

## 2025-06-11 RX ADMIN — Medication 10 MILLIGRAM(S): at 15:50

## 2025-06-11 RX ADMIN — POLYETHYLENE GLYCOL-3350 AND ELECTROLYTES 1000 MILLILITER(S): 236; 6.74; 5.86; 2.97; 22.74 POWDER, FOR SOLUTION ORAL at 17:45

## 2025-06-11 RX ADMIN — POLYETHYLENE GLYCOL-3350 AND ELECTROLYTES 1000 MILLILITER(S): 236; 6.74; 5.86; 2.97; 22.74 POWDER, FOR SOLUTION ORAL at 21:33

## 2025-06-11 NOTE — PROGRESS NOTE ADULT - SUBJECTIVE AND OBJECTIVE BOX
Patient is a 25y old  Male who presents with a chief complaint of fevers/chills/ body ache/ diffuse gas like pain in abdomen (11 Jun 2025 11:33)    INTERVAL HPI/OVERNIGHT EVENTS: Patient was seen and examined. Reports last diarrhea was two days ago. Afebrile. abdominal pain resolved. tolerating clears.     I&O's Summary    10 Jourdan 2025 07:01  -  11 Jun 2025 07:00  --------------------------------------------------------  IN: 725 mL / OUT: 0 mL / NET: 725 mL        LABS:                        14.8   6.34  )-----------( 229      ( 10 Jourdan 2025 07:30 )             42.8     06-10    138  |  106  |  12  ----------------------------<  89  4.2   |  27  |  1.20    Ca    9.4      10 Jourdan 2025 07:30        Urinalysis Basic - ( 10 Jourdan 2025 07:30 )    Color: x / Appearance: x / SG: x / pH: x  Gluc: 89 mg/dL / Ketone: x  / Bili: x / Urobili: x   Blood: x / Protein: x / Nitrite: x   Leuk Esterase: x / RBC: x / WBC x   Sq Epi: x / Non Sq Epi: x / Bacteria: x      CAPILLARY BLOOD GLUCOSE            Urinalysis Basic - ( 10 Jourdan 2025 07:30 )    Color: x / Appearance: x / SG: x / pH: x  Gluc: 89 mg/dL / Ketone: x  / Bili: x / Urobili: x   Blood: x / Protein: x / Nitrite: x   Leuk Esterase: x / RBC: x / WBC x   Sq Epi: x / Non Sq Epi: x / Bacteria: x        MEDICATIONS  (STANDING):  bisacodyl 10 milliGRAM(s) Oral every 4 hours  polyethylene glycol/electrolyte Solution 1000 milliLiter(s) Oral every 4 hours    MEDICATIONS  (PRN):  acetaminophen     Tablet .. 650 milliGRAM(s) Oral every 6 hours PRN Temp greater or equal to 38C (100.4F), Mild Pain (1 - 3)      REVIEW OF SYSTEMS:  CONSTITUTIONAL: No fever or chills  HEENT:  No headache  RESPIRATORY: No cough, wheezing, or shortness of breath  CARDIOVASCULAR: No chest pain  GASTROINTESTINAL: No abdominal pain, nausea, vomiting, or diarrhea  GENITOURINARY: No dysuria  NEUROLOGICAL: no focal weakness   MUSCULOSKELETAL: no myalgias  PSYCH: no recent changes in mood    RADIOLOGY & ADDITIONAL TESTS:    Imaging Personally Reviewed:  [x] YES  [ ] NO    Consultant(s) Notes Reviewed:  [x] YES  [ ] NO    PHYSICAL EXAM:  T(C): 36.8 (06-11-25 @ 12:11), Max: 36.8 (06-11-25 @ 12:11)  HR: 52 (06-11-25 @ 12:11) (52 - 72)  BP: 110/62 (06-11-25 @ 12:11) (110/62 - 126/79)  RR: 18 (06-11-25 @ 12:11) (16 - 18)  SpO2: 98% (06-11-25 @ 12:11) (96% - 98%)    GENERAL: awake, oriented   HEENT:  anicteric, moist mucous membranes  CHEST/LUNG:  CTA b/l, no rales, wheezes, or rhonchi  HEART:  RRR, S1, S2  ABDOMEN:  BS+, soft, nontender, nondistended  EXTREMITIES: no edema  NERVOUS SYSTEM: answers questions and follows commands appropriately  PSYCH: normal affect    Care Discussed with Consultants/Other Providers [x] YES  [ ] NO

## 2025-06-11 NOTE — CASE MANAGEMENT PROGRESS NOTE - NSCMPROGRESSNOTE_GEN_ALL_CORE
Discussed pt on rounds, pt remains acute, awaiting further testing, for colonoscopy 6/12/25, IV Zosyn. CM will continue to collaborate with interdisciplinary team and remain available to assist.

## 2025-06-11 NOTE — CHART NOTE - NSCHARTNOTEFT_GEN_A_CORE
Called by RN, patient complaining of persistent dizziness, similar to prior without any worsening; patient previously declined CT head, but now requesting CT head to be done.  - Patient denies any other acute symptoms per RN  - VS stable   - Urgent CT head no contrast ordered - follow up results   - Orthostatics ordered   - Rest of management per Primary Team   - RN to notify with any changes. Called by RN, patient complaining of persistent dizziness, similar to prior without any worsening; patient previously declined CT head on 6/10, but now requesting CT head to be done.  - Patient denies any other acute symptoms per RN  - VS stable   - Urgent CT head no contrast ordered - follow up results   - Orthostatics ordered   - Rest of management per Primary Team   - RN to notify with any changes. Called by RN, patient complaining of persistent dizziness, similar to prior without any worsening; patient previously declined CT head on 6/10, but now requesting CT head to be done.  - Patient denies any other acute symptoms per RN  - VS stable   - Urgent CT head no contrast ordered - follow up results   - Follow up orthostatics   - Rest of management per Primary Team   - RN to notify with any changes. Called by RN, patient complaining of persistent dizziness, similar to prior without any worsening; patient previously declined CT head on 6/10, but now requesting CT head to be done.  - Patient denies any other acute symptoms per RN  - VS stable   - Urgent CT head no contrast ordered - follow up results   - Follow up orthostatics   - Rest of management per Primary Team   - RN to notify with any changes.      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 Called by RN, patient complaining of persistent dizziness, similar to prior without any worsening; patient previously declined CT head on 6/10, but now requesting CT head to be done.  - Patient denies any other acute symptoms per RN  - VS stable   - Urgent CT head no contrast ordered - follow up results   - Follow up orthostatics   - Rest of management per Primary Team   - RN to notify with any changes.      ADDENDUM AT 00:40:  - CT head no cont showing  Age-indeterminate small left cerebellar infarct - discussed with patient   - Per chart review, 12/19/2023 MRA brain with Small left cerebellar lesion likely remote infarction   - Urgent MR head no contrast   - Rest of management per Primary Team   - RN to notify with any changes. Called by RN, patient complaining of persistent dizziness, similar to prior without any worsening; patient previously declined CT head on 6/10, but now requesting CT head to be done.  - Patient denies any other acute symptoms per RN  - VS stable   - Urgent CT head no contrast ordered - follow up results   - Orthostatics negative   - Rest of management per Primary Team   - RN to notify with any changes.      ADDENDUM AT 00:40:  - CT head no cont showing  Age-indeterminate small left cerebellar infarct - discussed with patient   - Per chart review, 12/19/2023 MRA brain with Small left cerebellar lesion likely remote infarction   - Urgent MR head no contrast   - Rest of management per Primary Team   - RN to notify with any changes. Pt with ankle fracture needs Ortho F/u.

## 2025-06-11 NOTE — PROGRESS NOTE ADULT - SUBJECTIVE AND OBJECTIVE BOX
[facility]    Jens Manning NP    [address] [place] [address]  [phone]      Chief Complaint:  Patient is a 25y old  Male who presents with a chief complaint of fevers/chills/ body ache/ diffuse gas like pain in abdomen (10 Jourdan 2025 16:10)      HPI/ 24 hr events:   Patient seen and examined at bedside  Reports feeling well this morning   Abdominal pain has mostly resolved   No fever, chills, nausea, or vomiting  No further diarrhea, stools now soft/brown   Was tolerating regular/low fiber diet yesterday   Pending colonoscopy tomorrow       REVIEW OF SYSTEMS:   General: Negative  HEENT: Negative  CV: Negative  Respiratory: Negative  GI: See HPI  : Negative  MSK: Negative  Hematologic: Negative  Skin: Negative    MEDICATIONS:   MEDICATIONS  (STANDING):  bisacodyl 10 milliGRAM(s) Oral every 4 hours  polyethylene glycol/electrolyte Solution 1000 milliLiter(s) Oral every 4 hours    MEDICATIONS  (PRN):  acetaminophen     Tablet .. 650 milliGRAM(s) Oral every 6 hours PRN Temp greater or equal to 38C (100.4F), Mild Pain (1 - 3)      ALLERGIES:   Allergies    No Known Allergies    Intolerances        VITAL SIGNS:   Vital Signs Last 24 Hrs  T(C): 36.3 (11 Jun 2025 05:42), Max: 36.9 (10 Jourdan 2025 11:43)  T(F): 97.4 (11 Jun 2025 05:42), Max: 98.4 (10 Jourdan 2025 11:43)  HR: 66 (11 Jun 2025 05:42) (64 - 72)  BP: 112/67 (11 Jun 2025 05:42) (112/67 - 126/79)  BP(mean): --  RR: 16 (11 Jun 2025 05:42) (16 - 19)  SpO2: 98% (11 Jun 2025 05:42) (96% - 99%)    Parameters below as of 11 Jun 2025 05:42  Patient On (Oxygen Delivery Method): room air      I&O's Summary    10 Jourdan 2025 07:01 - 11 Jun 2025 07:00  --------------------------------------------------------  IN: 725 mL / OUT: 0 mL / NET: 725 mL        PHYSICAL EXAM:   GENERAL:  No acute distress  HEENT:  NC/AT  CHEST:  No increased effort  HEART:  Regular rate  ABDOMEN:  Soft, non-tender, non-distended  EXTREMITIES: No cyanosis  SKIN:  Warm, dry  NEURO:  Calm, cooperative    LABS:                        14.8   6.34  )-----------( 229      ( 10 Jourdan 2025 07:30 )             42.8     06-10    138  |  106  |  12  ----------------------------<  89  4.2   |  27  |  1.20    Ca    9.4      10 Jourdan 2025 07:30                            GI PCR Panel: Arabella (06-09-25 @ 09:40)  GI PCR Panel: Indeterminate GI Panel PCR evaluates for:  Campylobacter, Plesiomonas shigelloides, Salmonella, Vibrio, Yersinia  enterocolitica, Enteroaggregative Escherichia (EAEC), Enteropathogenic E.  coli (EPEC), Enterotoxigenic E. coli (ETEC), Shiga-like toxinproducing  E. coli (STEC), E. coli O157, Shigella/Enteroinvasive E. coli (EIEC),  Adenovirus, Astrovirus, Norovirus, Rotavirus, Sapovirus, Cryptosporidium,  Cyclospora cayetanensis, Entamoeba histolytica, Giardia lamblia.  For culture and susceptibility reports refer to "reflex stool culture". (06-08-25 @ 13:30)                RADIOLOGY & ADDITIONAL STUDIES:

## 2025-06-11 NOTE — PROGRESS NOTE ADULT - NSPROGADDITIONALINFOA_GEN_ALL_CORE
I reviewed the overnight course of events on the unit, re-confirming the patient history. I discussed the care with the patient and their family  The plan of care was discussed with the nurse practitioner and modifications were made to the notation where appropriate.   Differential diagnosis and plan of care discussed with patient after the evaluation  35 minutes spent on total encounter of which more than fifty percent of the encounter was spent counseling and/or coordinating care by the attending physician.  Advanced care planning was discussed with patient and family.  Advanced care planning forms were reviewed and discussed.  Risks, benefits and alternatives of gastroenterologic procedures were discussed in detail and all questions were answered.

## 2025-06-11 NOTE — PROGRESS NOTE ADULT - TIME BILLING
Time spent includes direct patient care  (interview and examination of patient), discussion with other providers, support staff and/or patient's family members, review of medical records, ordering diagnostic tests and analyzing results, and documentation.
time spent reviewing the hospital notes, laboratory values, imaging findings, assessing/counseling the patient, discussing with consultant physicians, social work, nursing staff   This time spent excludes time spent teaching and/or separately reported services.   ---
Time spent includes direct patient care  (interview and examination of patient), discussion with other providers, support staff and/or patient's family members, review of medical records, ordering diagnostic tests and analyzing results, and documentation.
Time spent includes direct patient care  (interview and examination of patient), discussion with other providers, support staff and/or patient's family members, review of medical records, ordering diagnostic tests and analyzing results, and documentation.

## 2025-06-11 NOTE — PROGRESS NOTE ADULT - ASSESSMENT
25 y M w/ no PMH presents to ED due to fever, chills, body aches. Admitted for GI evaluation for CT imaging findings consistent with terminal ileitis concerning for infectious eitology vs Crohn's dx.    fever/abdominal pain     - CT with concerns for terminal ileitis     - plan for colonoscopy in AM     - NPO after midnight     - calprotectin elevated.     - GI and ID following. off abx. GI PCR repeat neg. BC NG 72h     prerenal LAUREN    - resolved. continue IVF     dizziness resolved.

## 2025-06-11 NOTE — PROGRESS NOTE ADULT - ASSESSMENT
Abdominal pain  Fever  Abnormal CT     CT A/P w/ PO & IVC: Findings consistent with terminal ileitis. Laboratory correlation for underlying inflammatory (i.e. Crohn's disease) versus infectious etiologies recommended.    Plan:  - CT A/P noted & discussed with patient  - GI PCR indeterminate for Norovirus; repeat GI PCR negative   - Planned for colonoscopy on Thursday, 6/12  - Clear liquids today   - NPO p MN   - Bowel prep ordered   - Fecal calprotectin and ASCA/ANCA sent, f/u results   - ESR/CRP elevated   - Pain management   - Monitor stools   - Antibiotics as per ID, on Zosyn  - BCx (6/7) prelim NGTD   - Will follow

## 2025-06-12 ENCOUNTER — TRANSCRIPTION ENCOUNTER (OUTPATIENT)
Age: 25
End: 2025-06-12

## 2025-06-12 VITALS
OXYGEN SATURATION: 97 % | RESPIRATION RATE: 18 BRPM | TEMPERATURE: 97 F | SYSTOLIC BLOOD PRESSURE: 133 MMHG | DIASTOLIC BLOOD PRESSURE: 69 MMHG | HEART RATE: 73 BPM

## 2025-06-12 LAB
ALBUMIN SERPL ELPH-MCNC: 3.7 G/DL — SIGNIFICANT CHANGE UP (ref 3.3–5)
ALP SERPL-CCNC: 86 U/L — SIGNIFICANT CHANGE UP (ref 40–120)
ALT FLD-CCNC: 44 U/L — SIGNIFICANT CHANGE UP (ref 12–78)
ANION GAP SERPL CALC-SCNC: 6 MMOL/L — SIGNIFICANT CHANGE UP (ref 5–17)
AST SERPL-CCNC: 20 U/L — SIGNIFICANT CHANGE UP (ref 15–37)
BILIRUB SERPL-MCNC: 0.4 MG/DL — SIGNIFICANT CHANGE UP (ref 0.2–1.2)
BUN SERPL-MCNC: 18 MG/DL — SIGNIFICANT CHANGE UP (ref 7–23)
CALCIUM SERPL-MCNC: 9.7 MG/DL — SIGNIFICANT CHANGE UP (ref 8.5–10.1)
CALPROTECTIN STL-MCNT: 687 UG/G — HIGH (ref 0–120)
CHLORIDE SERPL-SCNC: 105 MMOL/L — SIGNIFICANT CHANGE UP (ref 96–108)
CO2 SERPL-SCNC: 26 MMOL/L — SIGNIFICANT CHANGE UP (ref 22–31)
CREAT SERPL-MCNC: 1.2 MG/DL — SIGNIFICANT CHANGE UP (ref 0.5–1.3)
EGFR: 87 ML/MIN/1.73M2 — SIGNIFICANT CHANGE UP
EGFR: 87 ML/MIN/1.73M2 — SIGNIFICANT CHANGE UP
GLUCOSE SERPL-MCNC: 96 MG/DL — SIGNIFICANT CHANGE UP (ref 70–99)
HCT VFR BLD CALC: 46 % — SIGNIFICANT CHANGE UP (ref 39–50)
HGB BLD-MCNC: 15.7 G/DL — SIGNIFICANT CHANGE UP (ref 13–17)
MCHC RBC-ENTMCNC: 29 PG — SIGNIFICANT CHANGE UP (ref 27–34)
MCHC RBC-ENTMCNC: 34.1 G/DL — SIGNIFICANT CHANGE UP (ref 32–36)
MCV RBC AUTO: 84.9 FL — SIGNIFICANT CHANGE UP (ref 80–100)
NRBC BLD AUTO-RTO: 0 /100 WBCS — SIGNIFICANT CHANGE UP (ref 0–0)
PLATELET # BLD AUTO: 321 K/UL — SIGNIFICANT CHANGE UP (ref 150–400)
POTASSIUM SERPL-MCNC: 3.3 MMOL/L — LOW (ref 3.5–5.3)
POTASSIUM SERPL-SCNC: 3.3 MMOL/L — LOW (ref 3.5–5.3)
PROT SERPL-MCNC: 8.8 G/DL — HIGH (ref 6–8.3)
RBC # BLD: 5.42 M/UL — SIGNIFICANT CHANGE UP (ref 4.2–5.8)
RBC # FLD: 12.2 % — SIGNIFICANT CHANGE UP (ref 10.3–14.5)
SODIUM SERPL-SCNC: 137 MMOL/L — SIGNIFICANT CHANGE UP (ref 135–145)
WBC # BLD: 5.41 K/UL — SIGNIFICANT CHANGE UP (ref 3.8–10.5)
WBC # FLD AUTO: 5.41 K/UL — SIGNIFICANT CHANGE UP (ref 3.8–10.5)

## 2025-06-12 PROCEDURE — 0225U NFCT DS DNA&RNA 21 SARSCOV2: CPT

## 2025-06-12 PROCEDURE — 0241U: CPT

## 2025-06-12 PROCEDURE — 87591 N.GONORRHOEAE DNA AMP PROB: CPT

## 2025-06-12 PROCEDURE — 74177 CT ABD & PELVIS W/CONTRAST: CPT

## 2025-06-12 PROCEDURE — 87389 HIV-1 AG W/HIV-1&-2 AB AG IA: CPT

## 2025-06-12 PROCEDURE — 99233 SBSQ HOSP IP/OBS HIGH 50: CPT

## 2025-06-12 PROCEDURE — 87637 SARSCOV2&INF A&B&RSV AMP PRB: CPT

## 2025-06-12 PROCEDURE — 96375 TX/PRO/DX INJ NEW DRUG ADDON: CPT

## 2025-06-12 PROCEDURE — 93005 ELECTROCARDIOGRAM TRACING: CPT

## 2025-06-12 PROCEDURE — 86618 LYME DISEASE ANTIBODY: CPT

## 2025-06-12 PROCEDURE — 36415 COLL VENOUS BLD VENIPUNCTURE: CPT

## 2025-06-12 PROCEDURE — 87086 URINE CULTURE/COLONY COUNT: CPT

## 2025-06-12 PROCEDURE — 99285 EMERGENCY DEPT VISIT HI MDM: CPT | Mod: 25

## 2025-06-12 PROCEDURE — 70450 CT HEAD/BRAIN W/O DYE: CPT

## 2025-06-12 PROCEDURE — 85025 COMPLETE CBC W/AUTO DIFF WBC: CPT

## 2025-06-12 PROCEDURE — 85027 COMPLETE CBC AUTOMATED: CPT

## 2025-06-12 PROCEDURE — 86666 EHRLICHIA ANTIBODY: CPT

## 2025-06-12 PROCEDURE — 70551 MRI BRAIN STEM W/O DYE: CPT

## 2025-06-12 PROCEDURE — 83993 ASSAY FOR CALPROTECTIN FECAL: CPT

## 2025-06-12 PROCEDURE — 87798 DETECT AGENT NOS DNA AMP: CPT

## 2025-06-12 PROCEDURE — 88305 TISSUE EXAM BY PATHOLOGIST: CPT | Mod: 26

## 2025-06-12 PROCEDURE — 87507 IADNA-DNA/RNA PROBE TQ 12-25: CPT

## 2025-06-12 PROCEDURE — 86753 PROTOZOA ANTIBODY NOS: CPT

## 2025-06-12 PROCEDURE — 70551 MRI BRAIN STEM W/O DYE: CPT | Mod: 26

## 2025-06-12 PROCEDURE — 85730 THROMBOPLASTIN TIME PARTIAL: CPT

## 2025-06-12 PROCEDURE — 71046 X-RAY EXAM CHEST 2 VIEWS: CPT

## 2025-06-12 PROCEDURE — 87040 BLOOD CULTURE FOR BACTERIA: CPT

## 2025-06-12 PROCEDURE — 83520 IMMUNOASSAY QUANT NOS NONAB: CPT

## 2025-06-12 PROCEDURE — 86036 ANCA SCREEN EACH ANTIBODY: CPT

## 2025-06-12 PROCEDURE — 96374 THER/PROPH/DIAG INJ IV PUSH: CPT

## 2025-06-12 PROCEDURE — 80053 COMPREHEN METABOLIC PANEL: CPT

## 2025-06-12 PROCEDURE — 86140 C-REACTIVE PROTEIN: CPT

## 2025-06-12 PROCEDURE — 87491 CHLMYD TRACH DNA AMP PROBE: CPT

## 2025-06-12 PROCEDURE — 88305 TISSUE EXAM BY PATHOLOGIST: CPT

## 2025-06-12 PROCEDURE — 99239 HOSP IP/OBS DSCHRG MGMT >30: CPT

## 2025-06-12 PROCEDURE — 85652 RBC SED RATE AUTOMATED: CPT

## 2025-06-12 PROCEDURE — 82962 GLUCOSE BLOOD TEST: CPT

## 2025-06-12 PROCEDURE — 85610 PROTHROMBIN TIME: CPT

## 2025-06-12 PROCEDURE — 83605 ASSAY OF LACTIC ACID: CPT

## 2025-06-12 PROCEDURE — 81001 URINALYSIS AUTO W/SCOPE: CPT

## 2025-06-12 PROCEDURE — 80048 BASIC METABOLIC PNL TOTAL CA: CPT

## 2025-06-12 DEVICE — CLIP RESOLUTION 360 235CM: Type: IMPLANTABLE DEVICE | Status: FUNCTIONAL

## 2025-06-12 RX ORDER — PREDNISONE 20 MG/1
1 TABLET ORAL
Qty: 70 | Refills: 0
Start: 2025-06-12

## 2025-06-12 RX ADMIN — Medication 40 MILLIEQUIVALENT(S): at 14:46

## 2025-06-12 NOTE — DISCHARGE NOTE NURSING/CASE MANAGEMENT/SOCIAL WORK - FINANCIAL ASSISTANCE
[facility] provides services at a reduced cost to those who are determined to be eligible through [facility]’s financial assistance program. Information regarding [facility]’s financial assistance program can be found by going to [email or web address] or by calling [phone].

## 2025-06-12 NOTE — DISCHARGE NOTE PROVIDER - CARE PROVIDER_API CALL
Immanuel Thomas  Gastroenterology  [address] [place] [address]  Phone: [phone]  Fax: [phone]  Scheduled Appointment: 07/21/2025 10:45 AM    Marie Aguilar  Neurology  [address] [place] [id number]  Phone: [phone]  Fax: [phone]  Follow Up Time: 2 weeks

## 2025-06-12 NOTE — DISCHARGE NOTE PROVIDER - HOSPITAL COURSE
24 y/o M with no significant past medical history who presents for abdominal pain, diarrhea and fever for 5 days. He was sent in by urgent because of fever of 103. CT showed terminal ileitis that was concerning for possible infectious versus IBD. Patient was seen by GI and ID during hospitalization. Initially was on antibiotics which was discontinued. blood culture was NGTD. calprotectin was elevated. Colonoscopy was performed on 6/12/25 showing ulcerations and friability in the terminal ileum which was biopsied. He was started on a prednisone taper.     He did have dizziness. MRI showed chronic stable left cerebellar infarct which is unchanged from prior imaging.     He was seen on day of discharge. stable for discharge with outpatient follow up.     Consults:   GI: Dr France   ID: Dr Galvan     ---  TIME SPENT:  I, the attending physician, was physically present for the key portions of the evaluation and management (E/M) service provided. The total amount of time spent reviewing the hospital notes, laboratory values, imaging findings, assessing/counseling the patient, discussing with consultant physicians, social work, nursing staff was 45 minutes  This time spent excludes time spent teaching and/or separately reported services.   ---

## 2025-06-12 NOTE — DISCHARGE NOTE PROVIDER - CARE PROVIDERS DIRECT ADDRESSES
[email or web address]

## 2025-06-12 NOTE — PROGRESS NOTE ADULT - ASSESSMENT
25-year-old male with no prior medical history.  Patient presenting with fever abdominal pain.  Reports abdominal pain for past 5 days associated with constipation relieved with MiraLAX.  Started having fevers 3 days prior to presentation.  In the emergency room patient afebrile with no leukocytosis.  CT AP reporting terminal ileitis.  Patient started on Zosyn[place]. No recent travel. Played golf recently but no other outdoor activities.    Initial fevers likely 2/2 IBD. Found to have terminal ileitis, seems less likely infectious. S/p colonoscopy and plan to start steroids. GI PCR showed indeterminate norovirus, although repeat GI PCR is negative. Blood cultures remain no growth. Urine GC/chlamydia negative. Lyme and babesia negative. Tick PCR panel negative. HIV negative.    Remains afebrile and stable off antibiotics. No leukocytosis.    #Terminal ileitis   #Fevers  #Inflammatory bowel disease    -supportive management  -discussed with Dr. Dmitri Galvan MD  Division of infectious Diseases  Cell [phone] between 8am and 6pm  After 6pm and over the weekends please call ID service line at [phone].     35 minutes spent on total encounter assessing patient, examination, chart review, counseling and coordinating care by the attending physician/nurse/care manager.

## 2025-06-12 NOTE — DISCHARGE NOTE PROVIDER - NSDCCPCAREPLAN_GEN_ALL_CORE_FT
PRINCIPAL DISCHARGE DIAGNOSIS  Diagnosis: IBD (inflammatory bowel disease)  Assessment and Plan of Treatment: You had a colonoscopy and was started on prednisone taper.   start prednisone 40mg daily for 1 week;   then take 30mg daily for 1 week;   then take 20mg daily for 1 week,   and then take 10mg daily for 1 week and stop  Follow up with gastroenterology in the office on 7/21/25 at 10:45am      SECONDARY DISCHARGE DIAGNOSES  Diagnosis: Abnormal head MRI  Assessment and Plan of Treatment: You had a MRI brain that was negative for acute stroke. you were found to have a chronic infarct. Please follow up with neurology in the office. Call to make an appointment.

## 2025-06-12 NOTE — PROGRESS NOTE ADULT - REASON FOR ADMISSION
fevers/chills/ body ache/ diffuse gas like pain in abdomen

## 2025-06-12 NOTE — CASE MANAGEMENT PROGRESS NOTE - NSCMPROGRESSNOTE_GEN_ALL_CORE
Per MD pt is medically cleared for discharge today 6/12/25. CM met with patient to discuss transition of care. Pt gave verbal consent to speak with father at bedside. Patient has no skilled needs at this time. Pt is to be transitioned to home with no formal home care services. Pt states father will assist post discharge. Pt aware of plan and in agreement / verbalizes understanding. Discharge notice discussed / given. Pt verbalized understanding. Confirmed pharmacy is [facility]. community MD is Dr. Gonzalez. Pt stated they would make their own follow up appointments. Pt denies DME and need for usage. Pt stated hisfather will transport pt home. All questions answered. CM discussed plan with MD and RN. CM to remain available through hospitalization.

## 2025-06-12 NOTE — PROGRESS NOTE ADULT - PROVIDER SPECIALTY LIST ADULT
Gastroenterology
Hospitalist
Infectious Disease
Infectious Disease
Hospitalist
Gastroenterology
Infectious Disease
Hospitalist
Gastroenterology
Hospitalist

## 2025-06-12 NOTE — DISCHARGE NOTE PROVIDER - NSDCMRMEDTOKEN_GEN_ALL_CORE_FT
Pepcid 20 mg oral tablet: 1 tab(s) orally once a day  predniSONE 10 mg oral tablet: 1 tab(s) orally once a day take 4 tablets by mouth daily for one week, then take 3 tablets by mouth for one week, then take 2 tablets by mouth for one week, then take 1 tablet by mouth for one week then stop

## 2025-06-12 NOTE — DISCHARGE NOTE NURSING/CASE MANAGEMENT/SOCIAL WORK - PATIENT PORTAL LINK FT
You can access the FollowMyHealth Patient Portal offered by [facility] by registering at the following website: [email or web address]. By joining [employer]’s FollowMyHealth portal, you will also be able to view your health information using other applications (apps) compatible with our system. 20-Dec-2021 16:43

## 2025-06-12 NOTE — PROGRESS NOTE ADULT - SUBJECTIVE AND OBJECTIVE BOX
[facility]  INFECTIOUS DISEASES - [address], [place] [address]  Tel: [phone]     Fax: [phone]  =======================================================    SHIRAZPIEDAD [id number]    Follow up: No fevers. Seen earlier today. Denies any abdominal pain.    Allergies:  No Known Allergies      Antibiotics:  acetaminophen     Tablet .. 650 milliGRAM(s) Oral every 6 hours PRN       REVIEW OF SYSTEMS:  CONSTITUTIONAL:  No fevers  CARDIOVASCULAR:  No chest pain or SOB  RESPIRATORY:  No cough, shortness of breath  GASTROINTESTINAL:  No nausea or vomiting.  GENITOURINARY:  No dysuria, frequency or urgency  MUSCULOSKELETAL:  no joint aches, no muscle pain  NEUROLOGIC:  No headache  PSYCHIATRIC:  No disorder of thought or mood.     Physical Exam:  ICU Vital Signs Last 24 Hrs  T(C): 36.3 (12 Jun 2025 12:21), Max: 36.7 (12 Jun 2025 09:19)  T(F): 97.4 (12 Jun 2025 12:21), Max: 98.1 (12 Jun 2025 09:19)  HR: 73 (12 Jun 2025 12:21) (56 - 73)  BP: 133/69 (12 Jun 2025 12:21) (98/57 - 133/69)  BP(mean): 82 (12 Jun 2025 10:33) (82 - 82)  ABP: --  ABP(mean): --  RR: 18 (12 Jun 2025 12:21) (14 - 20)  SpO2: 97% (12 Jun 2025 12:21) (97% - 99%)    O2 Parameters below as of 12 Jun 2025 12:21  Patient On (Oxygen Delivery Method): room air        GEN: NAD  HEENT: normocephalic and atraumatic.  NECK: Supple.    LUNGS: Normal respiratory effort  HEART: Regular rate and rhythm   ABDOMEN: Soft, nontender, and nondistended.    EXTREMITIES: No leg edema.  NEUROLOGIC: AO x 3  PSYCHIATRIC: Appropriate affect .     Labs:  06-12    137  |  105  |  18  ----------------------------<  96  3.3[L]   |  26  |  1.20    Ca    9.7      12 Jun 2025 11:25    TPro  8.8[H]  /  Alb  3.7  /  TBili  0.4  /  DBili  x   /  AST  20  /  ALT  44  /  AlkPhos  86  06-12                          15.7   5.41  )-----------( 321      ( 12 Jun 2025 11:25 )             46.0       Urinalysis Basic - ( 12 Jun 2025 11:25 )    Color: x / Appearance: x / SG: x / pH: x  Gluc: 96 mg/dL / Ketone: x  / Bili: x / Urobili: x   Blood: x / Protein: x / Nitrite: x   Leuk Esterase: x / RBC: x / WBC x   Sq Epi: x / Non Sq Epi: x / Bacteria: x      LIVER FUNCTIONS - ( 12 Jun 2025 11:25 )  Alb: 3.7 g/dL / Pro: 8.8 g/dL / ALK PHOS: 86 U/L / ALT: 44 U/L / AST: 20 U/L / GGT: x             RECENT CULTURES:  06-09 @ 14:51          NotDetec  06-07 @ 20:27 Clean Catch Clean Catch (Midstream)     No growth        06-07 @ 19:00 Blood Blood-Peripheral     No growth at 4 days        06-07 @ 18:50 Blood Blood-Peripheral     No growth at 4 days              All imaging and data are reviewed.     Assessment and Plan:       Kady Galvan MD  Division of infectious Diseases  Cell [phone] between 8am and 6pm  After 6pm and over the weekends please call ID service line at [phone].     55 minutes spent on total encounter assessing patient, examination, chart review, counseling and coordinating care by the attending physician/nurse/care manager.

## 2025-06-12 NOTE — DISCHARGE NOTE PROVIDER - PROVIDER TOKENS
PROVIDER:[TOKEN:[[id number]],SCHEDULEDAPPT:[07/21/2025],SCHEDULEDAPPTTIME:[10:45 AM]],PROVIDER:[TOKEN:[[id number]],FOLLOWUP:[2 weeks]]

## 2025-06-13 LAB
A PHAGOCYTOPH IGG TITR SER IF: SIGNIFICANT CHANGE UP
B BURGDOR AB SER QL IA: 0.14 IV — SIGNIFICANT CHANGE UP
B MICROTI IGG TITR SER: SIGNIFICANT CHANGE UP
CULTURE RESULTS: SIGNIFICANT CHANGE UP
CULTURE RESULTS: SIGNIFICANT CHANGE UP
E CHAFFEENSIS IGG TITR SER IF: SIGNIFICANT CHANGE UP
SPECIMEN SOURCE: SIGNIFICANT CHANGE UP
SPECIMEN SOURCE: SIGNIFICANT CHANGE UP

## 2025-07-30 ENCOUNTER — NON-APPOINTMENT (OUTPATIENT)
Age: 25
End: 2025-07-30

## 2025-08-05 ENCOUNTER — APPOINTMENT (OUTPATIENT)
Dept: CT IMAGING | Facility: CLINIC | Age: 25
End: 2025-08-05
Payer: COMMERCIAL

## 2025-08-05 PROCEDURE — 74177 CT ABD & PELVIS W/CONTRAST: CPT

## (undated) DEVICE — CATH IV SAFE BC 22G X 1" (BLUE)

## (undated) DEVICE — STERIS DEFENDO 3-PIECE KIT (AIR/WATER, SUCTION & BIOPSY VALVES)

## (undated) DEVICE — CLAMP BX HOT RAD JAW 3

## (undated) DEVICE — FORCEP RADIAL JAW 4 W NDL 2.4MM 2.8MM 240CM ORANGE DISP

## (undated) DEVICE — TUBING CANNULA SALTER LABS NASAL ADULT 7FT

## (undated) DEVICE — SNARE POLYP SENS 27MM 240CM

## (undated) DEVICE — CATH IV SAFE BC 20G X 1.16" (PINK)

## (undated) DEVICE — MASK O2 ADLT POM ELITE W/ MED AND HI CONCEN M TO M 10FT

## (undated) DEVICE — CATH ELCTR GLIDE PRB 7FR

## (undated) DEVICE — FORMALIN CUPS 10% BUFFERED

## (undated) DEVICE — TUBING SUCTION CONN 6FT STERILE

## (undated) DEVICE — NDL INJ SCLERO INTERJECT 23G

## (undated) DEVICE — TUBING IV SET GRAVITY 3Y 100" MACRO

## (undated) DEVICE — CANISTER SUCTION 1200CC 10/SL

## (undated) DEVICE — SENSOR O2 FINGER XL ADULT 24/BX 6BX/CA

## (undated) DEVICE — SUCTION YANKAUER TAPERED BULBOUS NO VENT

## (undated) DEVICE — FORCEP RADIAL JAW 4 JUMBO 2.8MM 3.2MM 240CM ORANGE DISP

## (undated) DEVICE — SOL IRR NS 0.9% 250ML

## (undated) DEVICE — ENDOCUFF VISION SZ 3 SM PRPL

## (undated) DEVICE — ENDOCUFF VISION SZ 2 LG GRN

## (undated) DEVICE — SNARE LRG

## (undated) DEVICE — TRAP SPECIMEN SPUTUM 40CC

## (undated) DEVICE — SYR LUER SLIP TIP 50CC

## (undated) DEVICE — PACK IV START WITH CHG

## (undated) DEVICE — POLY TRAP ETRAP

## (undated) DEVICE — PLATE NESSY 170

## (undated) DEVICE — CATH ELECHMSTAT  INJ 7FR 210CM

## (undated) DEVICE — SYR LUER SLIP TIP 30CC

## (undated) DEVICE — Device

## (undated) DEVICE — MARKER ENDO SPOT EX

## (undated) DEVICE — SET IV PUMP BLOOD 1VALVE 180FILTER NON-DEHP

## (undated) DEVICE — VALVE BIOPSY

## (undated) DEVICE — SYR IV POSIFLUSH NS 3ML 30/TY

## (undated) DEVICE — TUBING IV SET SECONDARY 34"

## (undated) DEVICE — MASK O2 NON REBREATH 3IN1 ADULT

## (undated) DEVICE — SOL IRR POUR H2O 500ML